# Patient Record
Sex: MALE | Race: WHITE
[De-identification: names, ages, dates, MRNs, and addresses within clinical notes are randomized per-mention and may not be internally consistent; named-entity substitution may affect disease eponyms.]

---

## 2019-03-19 ENCOUNTER — HOSPITAL ENCOUNTER (INPATIENT)
Dept: HOSPITAL 94 - PCU 3S | Age: 54
LOS: 5 days | Discharge: HOME | End: 2019-03-24
Payer: MEDICARE

## 2019-03-19 DIAGNOSIS — J18.9: ICD-10-CM

## 2019-03-19 DIAGNOSIS — A41.9: Primary | ICD-10-CM

## 2019-03-19 DIAGNOSIS — J44.1: ICD-10-CM

## 2019-03-19 DIAGNOSIS — J96.01: ICD-10-CM

## 2021-09-20 ENCOUNTER — HOSPITAL ENCOUNTER (INPATIENT)
Dept: HOSPITAL 94 - ER | Age: 56
LOS: 11 days | Discharge: NURSING, REHAB OR LONG-TERM CARE | DRG: 271 | End: 2021-10-01
Attending: INTERNAL MEDICINE | Admitting: INTERNAL MEDICINE
Payer: MEDICARE

## 2021-09-20 VITALS — BODY MASS INDEX: 31.32 KG/M2 | HEIGHT: 73 IN | WEIGHT: 236.34 LBS

## 2021-09-20 DIAGNOSIS — E66.9: ICD-10-CM

## 2021-09-20 DIAGNOSIS — F17.200: ICD-10-CM

## 2021-09-20 DIAGNOSIS — Z86.16: ICD-10-CM

## 2021-09-20 DIAGNOSIS — D86.9: ICD-10-CM

## 2021-09-20 DIAGNOSIS — Z85.038: ICD-10-CM

## 2021-09-20 DIAGNOSIS — Z96.649: ICD-10-CM

## 2021-09-20 DIAGNOSIS — J44.9: ICD-10-CM

## 2021-09-20 DIAGNOSIS — Z90.79: ICD-10-CM

## 2021-09-20 DIAGNOSIS — G62.9: ICD-10-CM

## 2021-09-20 DIAGNOSIS — I74.3: Primary | ICD-10-CM

## 2021-09-20 DIAGNOSIS — Z79.899: ICD-10-CM

## 2021-09-20 DIAGNOSIS — Z85.47: ICD-10-CM

## 2021-09-20 DIAGNOSIS — Z79.52: ICD-10-CM

## 2021-09-20 DIAGNOSIS — I10: ICD-10-CM

## 2021-09-20 DIAGNOSIS — N17.9: ICD-10-CM

## 2021-09-20 PROCEDURE — 80053 COMPREHEN METABOLIC PANEL: CPT

## 2021-09-20 PROCEDURE — 82948 REAGENT STRIP/BLOOD GLUCOSE: CPT

## 2021-09-20 PROCEDURE — 36246 INS CATH ABD/L-EXT ART 2ND: CPT

## 2021-09-20 PROCEDURE — 97161 PT EVAL LOW COMPLEX 20 MIN: CPT

## 2021-09-20 PROCEDURE — 86885 COOMBS TEST INDIRECT QUAL: CPT

## 2021-09-20 PROCEDURE — 96374 THER/PROPH/DIAG INJ IV PUSH: CPT

## 2021-09-20 PROCEDURE — 37213 THROMBLYTIC ART/VEN THERAPY: CPT

## 2021-09-20 PROCEDURE — 99291 CRITICAL CARE FIRST HOUR: CPT

## 2021-09-20 PROCEDURE — 80048 BASIC METABOLIC PNL TOTAL CA: CPT

## 2021-09-20 PROCEDURE — 99153 MOD SED SAME PHYS/QHP EA: CPT

## 2021-09-20 PROCEDURE — 85730 THROMBOPLASTIN TIME PARTIAL: CPT

## 2021-09-20 PROCEDURE — 87081 CULTURE SCREEN ONLY: CPT

## 2021-09-20 PROCEDURE — 97530 THERAPEUTIC ACTIVITIES: CPT

## 2021-09-20 PROCEDURE — 75710 ARTERY X-RAYS ARM/LEG: CPT

## 2021-09-20 PROCEDURE — 88304 TISSUE EXAM BY PATHOLOGIST: CPT

## 2021-09-20 PROCEDURE — 86901 BLOOD TYPING SEROLOGIC RH(D): CPT

## 2021-09-20 PROCEDURE — 85610 PROTHROMBIN TIME: CPT

## 2021-09-20 PROCEDURE — 85025 COMPLETE CBC W/AUTO DIFF WBC: CPT

## 2021-09-20 PROCEDURE — 36415 COLL VENOUS BLD VENIPUNCTURE: CPT

## 2021-09-20 PROCEDURE — 86900 BLOOD TYPING SEROLOGIC ABO: CPT

## 2021-09-20 PROCEDURE — 85384 FIBRINOGEN ACTIVITY: CPT

## 2021-09-20 PROCEDURE — 37184 PRIM ART M-THRMBC 1ST VSL: CPT

## 2021-09-20 PROCEDURE — 76937 US GUIDE VASCULAR ACCESS: CPT

## 2021-09-20 PROCEDURE — 37214 CESSJ THERAPY CATH REMOVAL: CPT

## 2021-09-20 PROCEDURE — 97116 GAIT TRAINING THERAPY: CPT

## 2021-09-20 PROCEDURE — 99152 MOD SED SAME PHYS/QHP 5/>YRS: CPT

## 2021-09-20 PROCEDURE — 85027 COMPLETE CBC AUTOMATED: CPT

## 2021-09-20 PROCEDURE — 85347 COAGULATION TIME ACTIVATED: CPT

## 2021-09-20 PROCEDURE — 37211 THROMBOLYTIC ART THERAPY: CPT

## 2021-09-20 PROCEDURE — 97110 THERAPEUTIC EXERCISES: CPT

## 2021-09-20 PROCEDURE — 37228: CPT

## 2021-09-21 VITALS — DIASTOLIC BLOOD PRESSURE: 87 MMHG | SYSTOLIC BLOOD PRESSURE: 135 MMHG

## 2021-09-21 VITALS — DIASTOLIC BLOOD PRESSURE: 85 MMHG | SYSTOLIC BLOOD PRESSURE: 135 MMHG

## 2021-09-21 VITALS — DIASTOLIC BLOOD PRESSURE: 90 MMHG | SYSTOLIC BLOOD PRESSURE: 123 MMHG

## 2021-09-21 VITALS — DIASTOLIC BLOOD PRESSURE: 88 MMHG | SYSTOLIC BLOOD PRESSURE: 154 MMHG

## 2021-09-21 VITALS — DIASTOLIC BLOOD PRESSURE: 87 MMHG | SYSTOLIC BLOOD PRESSURE: 138 MMHG

## 2021-09-21 VITALS — SYSTOLIC BLOOD PRESSURE: 138 MMHG | DIASTOLIC BLOOD PRESSURE: 99 MMHG

## 2021-09-21 VITALS — SYSTOLIC BLOOD PRESSURE: 158 MMHG | DIASTOLIC BLOOD PRESSURE: 92 MMHG

## 2021-09-21 VITALS — DIASTOLIC BLOOD PRESSURE: 88 MMHG | SYSTOLIC BLOOD PRESSURE: 129 MMHG

## 2021-09-21 VITALS — SYSTOLIC BLOOD PRESSURE: 128 MMHG | DIASTOLIC BLOOD PRESSURE: 87 MMHG

## 2021-09-21 VITALS — DIASTOLIC BLOOD PRESSURE: 97 MMHG | SYSTOLIC BLOOD PRESSURE: 135 MMHG

## 2021-09-21 VITALS — SYSTOLIC BLOOD PRESSURE: 143 MMHG | DIASTOLIC BLOOD PRESSURE: 86 MMHG

## 2021-09-21 VITALS — SYSTOLIC BLOOD PRESSURE: 117 MMHG | DIASTOLIC BLOOD PRESSURE: 81 MMHG

## 2021-09-21 VITALS — DIASTOLIC BLOOD PRESSURE: 94 MMHG | SYSTOLIC BLOOD PRESSURE: 143 MMHG

## 2021-09-21 VITALS — SYSTOLIC BLOOD PRESSURE: 124 MMHG | DIASTOLIC BLOOD PRESSURE: 83 MMHG

## 2021-09-21 VITALS — SYSTOLIC BLOOD PRESSURE: 150 MMHG | DIASTOLIC BLOOD PRESSURE: 90 MMHG

## 2021-09-21 VITALS — SYSTOLIC BLOOD PRESSURE: 138 MMHG | DIASTOLIC BLOOD PRESSURE: 93 MMHG

## 2021-09-21 VITALS — DIASTOLIC BLOOD PRESSURE: 77 MMHG | SYSTOLIC BLOOD PRESSURE: 128 MMHG

## 2021-09-21 VITALS — SYSTOLIC BLOOD PRESSURE: 126 MMHG | DIASTOLIC BLOOD PRESSURE: 84 MMHG

## 2021-09-21 VITALS — DIASTOLIC BLOOD PRESSURE: 87 MMHG | SYSTOLIC BLOOD PRESSURE: 136 MMHG

## 2021-09-21 VITALS — DIASTOLIC BLOOD PRESSURE: 87 MMHG | SYSTOLIC BLOOD PRESSURE: 134 MMHG

## 2021-09-21 LAB
ALBUMIN SERPL BCP-MCNC: 2.5 G/DL (ref 3.4–5)
ALBUMIN SERPL BCP-MCNC: 2.5 G/DL (ref 3.4–5)
ANION GAP SERPL CALCULATED.3IONS-SCNC: 10 MMOL/L (ref 8–16)
ANION GAP SERPL CALCULATED.3IONS-SCNC: 10 MMOL/L (ref 8–16)
APTT PPP: 76 SECONDS (ref 22–32)
BASOPHILS # BLD AUTO: 0.1 X10'3 (ref 0–0.2)
BASOPHILS # BLD AUTO: 0.1 X10'3 (ref 0–0.2)
BASOPHILS NFR BLD AUTO: 0.8 % (ref 0–1)
BASOPHILS NFR BLD AUTO: 0.9 % (ref 0–1)
BUN SERPL-MCNC: 19 MG/DL (ref 7–18)
BUN SERPL-MCNC: 21 MG/DL (ref 7–18)
BUN/CREAT SERPL: 17.6 (ref 5.4–32)
BUN/CREAT SERPL: 17.6 (ref 5.4–32)
CALCIUM SERPL-MCNC: 8.5 MG/DL (ref 8.5–10.1)
CALCIUM SERPL-MCNC: 8.7 MG/DL (ref 8.5–10.1)
CHLORIDE SERPL-SCNC: 106 MMOL/L (ref 99–107)
CHLORIDE SERPL-SCNC: 107 MMOL/L (ref 99–107)
CO2 SERPL-SCNC: 26.5 MMOL/L (ref 24–32)
CO2 SERPL-SCNC: 27.1 MMOL/L (ref 24–32)
CREAT SERPL-MCNC: 1.08 MG/DL (ref 0.6–1.1)
CREAT SERPL-MCNC: 1.19 MG/DL (ref 0.6–1.1)
EOSINOPHIL # BLD AUTO: 0.1 X10'3 (ref 0–0.9)
EOSINOPHIL # BLD AUTO: 0.1 X10'3 (ref 0–0.9)
EOSINOPHIL NFR BLD AUTO: 1.1 % (ref 0–6)
EOSINOPHIL NFR BLD AUTO: 1.3 % (ref 0–6)
ERYTHROCYTE [DISTWIDTH] IN BLOOD BY AUTOMATED COUNT: 15.7 % (ref 11.5–14.5)
ERYTHROCYTE [DISTWIDTH] IN BLOOD BY AUTOMATED COUNT: 15.7 % (ref 11.5–14.5)
ERYTHROCYTE [DISTWIDTH] IN BLOOD BY AUTOMATED COUNT: 15.8 % (ref 11.5–14.5)
ERYTHROCYTE [DISTWIDTH] IN BLOOD BY AUTOMATED COUNT: 15.9 % (ref 11.5–14.5)
ERYTHROCYTE [DISTWIDTH] IN BLOOD BY AUTOMATED COUNT: 16.3 % (ref 11.5–14.5)
GFR SERPL CREATININE-BSD FRML MDRD: 63 ML/MIN
GFR SERPL CREATININE-BSD FRML MDRD: 70 ML/MIN
GLUCOSE SERPL-MCNC: 109 MG/DL (ref 70–104)
GLUCOSE SERPL-MCNC: 85 MG/DL (ref 70–104)
HCT VFR BLD AUTO: 44 % (ref 42–52)
HCT VFR BLD AUTO: 44.3 % (ref 42–52)
HCT VFR BLD AUTO: 44.5 % (ref 42–52)
HCT VFR BLD AUTO: 44.7 % (ref 42–52)
HCT VFR BLD AUTO: 46.7 % (ref 42–52)
HGB BLD-MCNC: 14.4 G/DL (ref 14–17.9)
HGB BLD-MCNC: 14.6 G/DL (ref 14–17.9)
HGB BLD-MCNC: 14.7 G/DL (ref 14–17.9)
HGB BLD-MCNC: 14.8 G/DL (ref 14–17.9)
HGB BLD-MCNC: 15.2 G/DL (ref 14–17.9)
LYMPHOCYTES # BLD AUTO: 1.2 X10'3 (ref 1.1–4.8)
LYMPHOCYTES # BLD AUTO: 1.3 X10'3 (ref 1.1–4.8)
LYMPHOCYTES NFR BLD AUTO: 12.6 % (ref 21–51)
LYMPHOCYTES NFR BLD AUTO: 13.9 % (ref 21–51)
MCH RBC QN AUTO: 28.2 PG (ref 27–31)
MCH RBC QN AUTO: 28.5 PG (ref 27–31)
MCH RBC QN AUTO: 28.5 PG (ref 27–31)
MCH RBC QN AUTO: 28.6 PG (ref 27–31)
MCH RBC QN AUTO: 28.8 PG (ref 27–31)
MCHC RBC AUTO-ENTMCNC: 32.5 G/DL (ref 33–36.5)
MCHC RBC AUTO-ENTMCNC: 32.7 G/DL (ref 33–36.5)
MCHC RBC AUTO-ENTMCNC: 32.8 G/DL (ref 33–36.5)
MCHC RBC AUTO-ENTMCNC: 33.1 G/DL (ref 33–36.5)
MCHC RBC AUTO-ENTMCNC: 33.2 G/DL (ref 33–36.5)
MCV RBC AUTO: 86 FL (ref 78–98)
MCV RBC AUTO: 86.1 FL (ref 78–98)
MCV RBC AUTO: 86.2 FL (ref 78–98)
MCV RBC AUTO: 87.5 FL (ref 78–98)
MCV RBC AUTO: 87.8 FL (ref 78–98)
MONOCYTES # BLD AUTO: 1.1 X10'3 (ref 0–0.9)
MONOCYTES # BLD AUTO: 1.3 X10'3 (ref 0–0.9)
MONOCYTES NFR BLD AUTO: 12.1 % (ref 2–12)
MONOCYTES NFR BLD AUTO: 12.6 % (ref 2–12)
NEUTROPHILS # BLD AUTO: 6.5 X10'3 (ref 1.8–7.7)
NEUTROPHILS # BLD AUTO: 7.4 X10'3 (ref 1.8–7.7)
NEUTROPHILS NFR BLD AUTO: 71.9 % (ref 42–75)
NEUTROPHILS NFR BLD AUTO: 72.8 % (ref 42–75)
PLATELET # BLD AUTO: 239 X10'3 (ref 140–440)
PLATELET # BLD AUTO: 276 X10'3 (ref 140–440)
PLATELET # BLD AUTO: 278 X10'3 (ref 140–440)
PLATELET # BLD AUTO: 279 X10'3 (ref 140–440)
PLATELET # BLD AUTO: 301 X10'3 (ref 140–440)
PMV BLD AUTO: 6.9 FL (ref 7.4–10.4)
PMV BLD AUTO: 7 FL (ref 7.4–10.4)
PMV BLD AUTO: 7 FL (ref 7.4–10.4)
PMV BLD AUTO: 7.1 FL (ref 7.4–10.4)
PMV BLD AUTO: 7.2 FL (ref 7.4–10.4)
POTASSIUM SERPL-SCNC: 4.2 MMOL/L (ref 3.5–5.1)
POTASSIUM SERPL-SCNC: 4.6 MMOL/L (ref 3.5–5.1)
RBC # BLD AUTO: 5.01 X10'6 (ref 4.7–6.1)
RBC # BLD AUTO: 5.15 X10'6 (ref 4.7–6.1)
RBC # BLD AUTO: 5.18 X10'6 (ref 4.7–6.1)
RBC # BLD AUTO: 5.19 X10'6 (ref 4.7–6.1)
RBC # BLD AUTO: 5.34 X10'6 (ref 4.7–6.1)
SODIUM SERPL-SCNC: 143 MMOL/L (ref 135–145)
SODIUM SERPL-SCNC: 143 MMOL/L (ref 135–145)
WBC # BLD AUTO: 10.2 X10'3 (ref 4.5–11)
WBC # BLD AUTO: 9 X10'3 (ref 4.5–11)
WBC # BLD AUTO: 9.1 X10'3 (ref 4.5–11)
WBC # BLD AUTO: 9.2 X10'3 (ref 4.5–11)
WBC # BLD AUTO: 9.2 X10'3 (ref 4.5–11)

## 2021-09-21 PROCEDURE — B41G1ZZ FLUOROSCOPY OF LEFT LOWER EXTREMITY ARTERIES USING LOW OSMOLAR CONTRAST: ICD-10-PCS | Performed by: RADIOLOGY

## 2021-09-21 PROCEDURE — B41C1ZZ FLUOROSCOPY OF PELVIC ARTERIES USING LOW OSMOLAR CONTRAST: ICD-10-PCS | Performed by: RADIOLOGY

## 2021-09-21 PROCEDURE — 04HL33Z INSERTION OF INFUSION DEVICE INTO LEFT FEMORAL ARTERY, PERCUTANEOUS APPROACH: ICD-10-PCS | Performed by: RADIOLOGY

## 2021-09-21 PROCEDURE — 3E05317 INTRODUCTION OF OTHER THROMBOLYTIC INTO PERIPHERAL ARTERY, PERCUTANEOUS APPROACH: ICD-10-PCS | Performed by: RADIOLOGY

## 2021-09-21 RX ADMIN — HYDROMORPHONE HYDROCHLORIDE SCH MLS/HR: 10 INJECTION, SOLUTION INTRAMUSCULAR; INTRAVENOUS; SUBCUTANEOUS at 19:00

## 2021-09-21 RX ADMIN — ALTEPLASE SCH MLS/HR: 2.2 INJECTION, POWDER, LYOPHILIZED, FOR SOLUTION INTRAVENOUS at 03:44

## 2021-09-21 RX ADMIN — HYDROMORPHONE HYDROCHLORIDE SCH MLS/HR: 10 INJECTION, SOLUTION INTRAMUSCULAR; INTRAVENOUS; SUBCUTANEOUS at 21:00

## 2021-09-21 RX ADMIN — HYDROMORPHONE HYDROCHLORIDE SCH MLS/HR: 10 INJECTION, SOLUTION INTRAMUSCULAR; INTRAVENOUS; SUBCUTANEOUS at 17:12

## 2021-09-21 RX ADMIN — ALTEPLASE SCH MLS/HR: 2.2 INJECTION, POWDER, LYOPHILIZED, FOR SOLUTION INTRAVENOUS at 19:09

## 2021-09-21 RX ADMIN — ALTEPLASE SCH MLS/HR: 2.2 INJECTION, POWDER, LYOPHILIZED, FOR SOLUTION INTRAVENOUS at 00:50

## 2021-09-21 RX ADMIN — HEPARIN SODIUM IN SODIUM CHLORIDE SCH MLS/HR: 200 INJECTION INTRAVENOUS at 19:12

## 2021-09-21 RX ADMIN — HEPARIN SODIUM IN SODIUM CHLORIDE SCH MLS/HR: 200 INJECTION INTRAVENOUS at 15:05

## 2021-09-21 RX ADMIN — BUPROPION HYDROCHLORIDE SCH MG: 150 TABLET, FILM COATED, EXTENDED RELEASE ORAL at 20:52

## 2021-09-21 RX ADMIN — ALTEPLASE SCH MLS/HR: 2.2 INJECTION, POWDER, LYOPHILIZED, FOR SOLUTION INTRAVENOUS at 09:38

## 2021-09-21 RX ADMIN — HYDROMORPHONE HYDROCHLORIDE SCH MLS/HR: 10 INJECTION, SOLUTION INTRAMUSCULAR; INTRAVENOUS; SUBCUTANEOUS at 19:09

## 2021-09-21 RX ADMIN — MORPHINE SULFATE PRN MG: 4 INJECTION, SOLUTION INTRAMUSCULAR; INTRAVENOUS at 06:18

## 2021-09-21 RX ADMIN — MORPHINE SULFATE PRN MG: 4 INJECTION, SOLUTION INTRAMUSCULAR; INTRAVENOUS at 08:34

## 2021-09-21 RX ADMIN — HEPARIN SODIUM IN SODIUM CHLORIDE SCH MLS/HR: 200 INJECTION INTRAVENOUS at 03:43

## 2021-09-21 RX ADMIN — HYDROMORPHONE HYDROCHLORIDE SCH MLS/HR: 10 INJECTION, SOLUTION INTRAMUSCULAR; INTRAVENOUS; SUBCUTANEOUS at 23:00

## 2021-09-21 NOTE — NUR
Dr Keita called to check on pt. Reported that I was unable to find a left dorsalis pedal 
pulse with doppler.  Left posterior tibial pulse was present with doppler. Pt able to move 
bilateral feet.  Pt reporting increasing reperfusion pain.  Received orders to continue 
monitoring and report angiogram results.

## 2021-09-21 NOTE — NUR
Problems reprioritized. Patient report given, questions answered & plan of care reviewed 
with JOSEPH Matthews.

## 2021-09-21 NOTE — NUR
Dr. Ramsay rounded on pt and assessed BLE closely. She was able to doppler and jossy a 
dorsalis pedal pulse and marked pulse. 500 mL fluid bolus ordered and fluid rate of NS at 
125 mL/hr ordered.

## 2021-09-21 NOTE — NUR
Angio nurse Connie RUIZ discovered tPA infusing at incorrect rate and reported this to DR Aponte. Correction to rate made.

## 2021-09-21 NOTE — NUR
Problems reprioritized. Patient report given, questions answered & plan of care reviewed 
with JOSEPH Olmos.

## 2021-09-22 VITALS — DIASTOLIC BLOOD PRESSURE: 87 MMHG | SYSTOLIC BLOOD PRESSURE: 147 MMHG

## 2021-09-22 VITALS — DIASTOLIC BLOOD PRESSURE: 97 MMHG | SYSTOLIC BLOOD PRESSURE: 159 MMHG

## 2021-09-22 VITALS — SYSTOLIC BLOOD PRESSURE: 157 MMHG | DIASTOLIC BLOOD PRESSURE: 91 MMHG

## 2021-09-22 VITALS — SYSTOLIC BLOOD PRESSURE: 156 MMHG | DIASTOLIC BLOOD PRESSURE: 91 MMHG

## 2021-09-22 VITALS — DIASTOLIC BLOOD PRESSURE: 98 MMHG | SYSTOLIC BLOOD PRESSURE: 165 MMHG

## 2021-09-22 VITALS — DIASTOLIC BLOOD PRESSURE: 97 MMHG | SYSTOLIC BLOOD PRESSURE: 149 MMHG

## 2021-09-22 VITALS — DIASTOLIC BLOOD PRESSURE: 104 MMHG | SYSTOLIC BLOOD PRESSURE: 154 MMHG

## 2021-09-22 VITALS — DIASTOLIC BLOOD PRESSURE: 89 MMHG | SYSTOLIC BLOOD PRESSURE: 154 MMHG

## 2021-09-22 VITALS — DIASTOLIC BLOOD PRESSURE: 89 MMHG | SYSTOLIC BLOOD PRESSURE: 160 MMHG

## 2021-09-22 VITALS — DIASTOLIC BLOOD PRESSURE: 94 MMHG | SYSTOLIC BLOOD PRESSURE: 162 MMHG

## 2021-09-22 VITALS — SYSTOLIC BLOOD PRESSURE: 157 MMHG | DIASTOLIC BLOOD PRESSURE: 96 MMHG

## 2021-09-22 VITALS — DIASTOLIC BLOOD PRESSURE: 83 MMHG | SYSTOLIC BLOOD PRESSURE: 154 MMHG

## 2021-09-22 VITALS — SYSTOLIC BLOOD PRESSURE: 167 MMHG | DIASTOLIC BLOOD PRESSURE: 93 MMHG

## 2021-09-22 VITALS — DIASTOLIC BLOOD PRESSURE: 97 MMHG | SYSTOLIC BLOOD PRESSURE: 151 MMHG

## 2021-09-22 VITALS — DIASTOLIC BLOOD PRESSURE: 82 MMHG | SYSTOLIC BLOOD PRESSURE: 144 MMHG

## 2021-09-22 VITALS — SYSTOLIC BLOOD PRESSURE: 147 MMHG | DIASTOLIC BLOOD PRESSURE: 96 MMHG

## 2021-09-22 VITALS — DIASTOLIC BLOOD PRESSURE: 93 MMHG | SYSTOLIC BLOOD PRESSURE: 159 MMHG

## 2021-09-22 VITALS — DIASTOLIC BLOOD PRESSURE: 81 MMHG | SYSTOLIC BLOOD PRESSURE: 138 MMHG

## 2021-09-22 VITALS — SYSTOLIC BLOOD PRESSURE: 146 MMHG | DIASTOLIC BLOOD PRESSURE: 99 MMHG

## 2021-09-22 VITALS — SYSTOLIC BLOOD PRESSURE: 156 MMHG | DIASTOLIC BLOOD PRESSURE: 90 MMHG

## 2021-09-22 VITALS — SYSTOLIC BLOOD PRESSURE: 144 MMHG | DIASTOLIC BLOOD PRESSURE: 92 MMHG

## 2021-09-22 LAB
ALBUMIN SERPL BCP-MCNC: 2.5 G/DL (ref 3.4–5)
ALBUMIN/GLOB SERPL: 0.6 {RATIO} (ref 1.1–1.5)
ALP SERPL-CCNC: 135 IU/L (ref 46–116)
ALT SERPL W P-5'-P-CCNC: 32 U/L (ref 12–78)
ANION GAP SERPL CALCULATED.3IONS-SCNC: 9 MMOL/L (ref 8–16)
AST SERPL W P-5'-P-CCNC: 21 U/L (ref 10–37)
BASOPHILS # BLD AUTO: 0.1 X10'3 (ref 0–0.2)
BASOPHILS NFR BLD AUTO: 0.5 % (ref 0–1)
BILIRUB SERPL-MCNC: 0.8 MG/DL (ref 0.1–1)
BUN SERPL-MCNC: 17 MG/DL (ref 7–18)
BUN/CREAT SERPL: 13.8 (ref 5.4–32)
CALCIUM SERPL-MCNC: 8.5 MG/DL (ref 8.5–10.1)
CHLORIDE SERPL-SCNC: 105 MMOL/L (ref 99–107)
CO2 SERPL-SCNC: 27.5 MMOL/L (ref 24–32)
CREAT SERPL-MCNC: 1.23 MG/DL (ref 0.6–1.1)
EOSINOPHIL # BLD AUTO: 0.1 X10'3 (ref 0–0.9)
EOSINOPHIL NFR BLD AUTO: 1.1 % (ref 0–6)
ERYTHROCYTE [DISTWIDTH] IN BLOOD BY AUTOMATED COUNT: 15.5 % (ref 11.5–14.5)
ERYTHROCYTE [DISTWIDTH] IN BLOOD BY AUTOMATED COUNT: 15.6 % (ref 11.5–14.5)
ERYTHROCYTE [DISTWIDTH] IN BLOOD BY AUTOMATED COUNT: 15.6 % (ref 11.5–14.5)
ERYTHROCYTE [DISTWIDTH] IN BLOOD BY AUTOMATED COUNT: 15.8 % (ref 11.5–14.5)
GFR SERPL CREATININE-BSD FRML MDRD: 60 ML/MIN
GLUCOSE SERPL-MCNC: 89 MG/DL (ref 70–104)
HCT VFR BLD AUTO: 45.5 % (ref 42–52)
HCT VFR BLD AUTO: 45.9 % (ref 42–52)
HCT VFR BLD AUTO: 46.7 % (ref 42–52)
HCT VFR BLD AUTO: 47 % (ref 42–52)
HGB BLD-MCNC: 15.1 G/DL (ref 14–17.9)
HGB BLD-MCNC: 15.2 G/DL (ref 14–17.9)
HGB BLD-MCNC: 15.4 G/DL (ref 14–17.9)
HGB BLD-MCNC: 15.6 G/DL (ref 14–17.9)
LYMPHOCYTES # BLD AUTO: 0.7 X10'3 (ref 1.1–4.8)
LYMPHOCYTES NFR BLD AUTO: 6.3 % (ref 21–51)
MCH RBC QN AUTO: 28.7 PG (ref 27–31)
MCH RBC QN AUTO: 28.8 PG (ref 27–31)
MCH RBC QN AUTO: 28.9 PG (ref 27–31)
MCH RBC QN AUTO: 28.9 PG (ref 27–31)
MCHC RBC AUTO-ENTMCNC: 32.7 G/DL (ref 33–36.5)
MCHC RBC AUTO-ENTMCNC: 33 G/DL (ref 33–36.5)
MCHC RBC AUTO-ENTMCNC: 33.1 G/DL (ref 33–36.5)
MCHC RBC AUTO-ENTMCNC: 33.3 G/DL (ref 33–36.5)
MCV RBC AUTO: 86.7 FL (ref 78–98)
MCV RBC AUTO: 86.9 FL (ref 78–98)
MCV RBC AUTO: 87.3 FL (ref 78–98)
MCV RBC AUTO: 88.1 FL (ref 78–98)
MONOCYTES # BLD AUTO: 1.1 X10'3 (ref 0–0.9)
MONOCYTES NFR BLD AUTO: 10.6 % (ref 2–12)
NEUTROPHILS # BLD AUTO: 8.7 X10'3 (ref 1.8–7.7)
NEUTROPHILS NFR BLD AUTO: 81.5 % (ref 42–75)
PLATELET # BLD AUTO: 233 X10'3 (ref 140–440)
PLATELET # BLD AUTO: 255 X10'3 (ref 140–440)
PLATELET # BLD AUTO: 261 X10'3 (ref 140–440)
PLATELET # BLD AUTO: 267 X10'3 (ref 140–440)
PMV BLD AUTO: 7.1 FL (ref 7.4–10.4)
PMV BLD AUTO: 7.2 FL (ref 7.4–10.4)
POTASSIUM SERPL-SCNC: 4.5 MMOL/L (ref 3.5–5.1)
PROT SERPL-MCNC: 6.5 G/DL (ref 6.4–8.2)
RBC # BLD AUTO: 5.22 X10'6 (ref 4.7–6.1)
RBC # BLD AUTO: 5.28 X10'6 (ref 4.7–6.1)
RBC # BLD AUTO: 5.34 X10'6 (ref 4.7–6.1)
RBC # BLD AUTO: 5.39 X10'6 (ref 4.7–6.1)
SODIUM SERPL-SCNC: 141 MMOL/L (ref 135–145)
WBC # BLD AUTO: 10.6 X10'3 (ref 4.5–11)
WBC # BLD AUTO: 10.7 X10'3 (ref 4.5–11)
WBC # BLD AUTO: 10.8 X10'3 (ref 4.5–11)
WBC # BLD AUTO: 12.5 X10'3 (ref 4.5–11)

## 2021-09-22 PROCEDURE — 3E05317 INTRODUCTION OF OTHER THROMBOLYTIC INTO PERIPHERAL ARTERY, PERCUTANEOUS APPROACH: ICD-10-PCS | Performed by: RADIOLOGY

## 2021-09-22 PROCEDURE — 047S3ZZ DILATION OF LEFT POSTERIOR TIBIAL ARTERY, PERCUTANEOUS APPROACH: ICD-10-PCS | Performed by: RADIOLOGY

## 2021-09-22 PROCEDURE — X2CT3T7 EXTIRPATION OF MATTER FROM LEFT LOWER EXTREMITY ARTERY USING COMPUTER-AIDED MECHANICAL ASPIRATION, PERCUTANEOUS APPROACH, NEW TECHNOLOGY GROUP 7: ICD-10-PCS | Performed by: RADIOLOGY

## 2021-09-22 RX ADMIN — HYDROMORPHONE HYDROCHLORIDE SCH MLS/HR: 10 INJECTION, SOLUTION INTRAMUSCULAR; INTRAVENOUS; SUBCUTANEOUS at 05:00

## 2021-09-22 RX ADMIN — HYDROMORPHONE HYDROCHLORIDE SCH MLS/HR: 10 INJECTION, SOLUTION INTRAMUSCULAR; INTRAVENOUS; SUBCUTANEOUS at 09:00

## 2021-09-22 RX ADMIN — ALTEPLASE SCH MLS/HR: 2.2 INJECTION, POWDER, LYOPHILIZED, FOR SOLUTION INTRAVENOUS at 16:21

## 2021-09-22 RX ADMIN — HEPARIN SODIUM AND DEXTROSE SCH MLS/HR: 10000; 5 INJECTION INTRAVENOUS at 20:10

## 2021-09-22 RX ADMIN — HYDROMORPHONE HYDROCHLORIDE SCH MLS/HR: 10 INJECTION, SOLUTION INTRAMUSCULAR; INTRAVENOUS; SUBCUTANEOUS at 13:00

## 2021-09-22 RX ADMIN — HYDROMORPHONE HYDROCHLORIDE SCH MLS/HR: 10 INJECTION, SOLUTION INTRAMUSCULAR; INTRAVENOUS; SUBCUTANEOUS at 01:00

## 2021-09-22 RX ADMIN — HYDROMORPHONE HYDROCHLORIDE SCH MLS/HR: 10 INJECTION, SOLUTION INTRAMUSCULAR; INTRAVENOUS; SUBCUTANEOUS at 18:56

## 2021-09-22 RX ADMIN — ALTEPLASE SCH MLS/HR: 2.2 INJECTION, POWDER, LYOPHILIZED, FOR SOLUTION INTRAVENOUS at 21:40

## 2021-09-22 RX ADMIN — HYDROMORPHONE HYDROCHLORIDE SCH MLS/HR: 10 INJECTION, SOLUTION INTRAMUSCULAR; INTRAVENOUS; SUBCUTANEOUS at 15:00

## 2021-09-22 RX ADMIN — BUPROPION HYDROCHLORIDE SCH MG: 150 TABLET, FILM COATED, EXTENDED RELEASE ORAL at 08:41

## 2021-09-22 RX ADMIN — HEPARIN SODIUM IN SODIUM CHLORIDE SCH MLS/HR: 200 INJECTION INTRAVENOUS at 16:37

## 2021-09-22 RX ADMIN — HYDROMORPHONE HYDROCHLORIDE SCH MLS/HR: 10 INJECTION, SOLUTION INTRAMUSCULAR; INTRAVENOUS; SUBCUTANEOUS at 07:00

## 2021-09-22 RX ADMIN — HEPARIN SODIUM IN SODIUM CHLORIDE SCH MLS/HR: 200 INJECTION INTRAVENOUS at 03:25

## 2021-09-22 RX ADMIN — HYDROMORPHONE HYDROCHLORIDE SCH MLS/HR: 10 INJECTION, SOLUTION INTRAMUSCULAR; INTRAVENOUS; SUBCUTANEOUS at 17:00

## 2021-09-22 RX ADMIN — HYDROMORPHONE HYDROCHLORIDE SCH MLS/HR: 10 INJECTION, SOLUTION INTRAMUSCULAR; INTRAVENOUS; SUBCUTANEOUS at 23:00

## 2021-09-22 RX ADMIN — HYDROMORPHONE HYDROCHLORIDE SCH MLS/HR: 10 INJECTION, SOLUTION INTRAMUSCULAR; INTRAVENOUS; SUBCUTANEOUS at 11:00

## 2021-09-22 RX ADMIN — ALTEPLASE SCH MLS/HR: 2.2 INJECTION, POWDER, LYOPHILIZED, FOR SOLUTION INTRAVENOUS at 03:26

## 2021-09-22 RX ADMIN — BUPROPION HYDROCHLORIDE SCH MG: 150 TABLET, FILM COATED, EXTENDED RELEASE ORAL at 23:03

## 2021-09-22 RX ADMIN — HYDROMORPHONE HYDROCHLORIDE SCH MLS/HR: 10 INJECTION, SOLUTION INTRAMUSCULAR; INTRAVENOUS; SUBCUTANEOUS at 03:00

## 2021-09-22 RX ADMIN — ALTEPLASE SCH MLS/HR: 2.2 INJECTION, POWDER, LYOPHILIZED, FOR SOLUTION INTRAVENOUS at 12:01

## 2021-09-22 RX ADMIN — HYDROMORPHONE HYDROCHLORIDE SCH MLS/HR: 10 INJECTION, SOLUTION INTRAMUSCULAR; INTRAVENOUS; SUBCUTANEOUS at 21:00

## 2021-09-22 NOTE — NUR
Problems reprioritized. Patient report given, questions answered & plan of care reviewed 
with JOSEPH Munoz.

## 2021-09-22 NOTE — NUR
Pt left transported to IR at 1745, transported via bed with heart monitor,  O2 2L, mask and 
x2 RNs ; hand off given at bedside to transport staff ; VS stable ; ETA is 45 minutes if no 
further interventions beyond "pictures". Hand off given to oncoming RN, PCA Dilaudid, 
Heparin and tPA drips still infusing, D5NS @125, No s/s of distress noted.

## 2021-09-23 VITALS — DIASTOLIC BLOOD PRESSURE: 84 MMHG | SYSTOLIC BLOOD PRESSURE: 148 MMHG

## 2021-09-23 VITALS — DIASTOLIC BLOOD PRESSURE: 82 MMHG | SYSTOLIC BLOOD PRESSURE: 153 MMHG

## 2021-09-23 VITALS — DIASTOLIC BLOOD PRESSURE: 88 MMHG | SYSTOLIC BLOOD PRESSURE: 139 MMHG

## 2021-09-23 VITALS — DIASTOLIC BLOOD PRESSURE: 96 MMHG | SYSTOLIC BLOOD PRESSURE: 144 MMHG

## 2021-09-23 VITALS — SYSTOLIC BLOOD PRESSURE: 153 MMHG | DIASTOLIC BLOOD PRESSURE: 89 MMHG

## 2021-09-23 VITALS — SYSTOLIC BLOOD PRESSURE: 128 MMHG | DIASTOLIC BLOOD PRESSURE: 76 MMHG

## 2021-09-23 VITALS — DIASTOLIC BLOOD PRESSURE: 54 MMHG | SYSTOLIC BLOOD PRESSURE: 147 MMHG

## 2021-09-23 VITALS — DIASTOLIC BLOOD PRESSURE: 87 MMHG | SYSTOLIC BLOOD PRESSURE: 147 MMHG

## 2021-09-23 VITALS — DIASTOLIC BLOOD PRESSURE: 97 MMHG | SYSTOLIC BLOOD PRESSURE: 149 MMHG

## 2021-09-23 VITALS — SYSTOLIC BLOOD PRESSURE: 154 MMHG | DIASTOLIC BLOOD PRESSURE: 81 MMHG

## 2021-09-23 VITALS — SYSTOLIC BLOOD PRESSURE: 189 MMHG | DIASTOLIC BLOOD PRESSURE: 111 MMHG

## 2021-09-23 VITALS — SYSTOLIC BLOOD PRESSURE: 152 MMHG | DIASTOLIC BLOOD PRESSURE: 89 MMHG

## 2021-09-23 VITALS — SYSTOLIC BLOOD PRESSURE: 136 MMHG | DIASTOLIC BLOOD PRESSURE: 100 MMHG

## 2021-09-23 VITALS — SYSTOLIC BLOOD PRESSURE: 163 MMHG | DIASTOLIC BLOOD PRESSURE: 90 MMHG

## 2021-09-23 VITALS — DIASTOLIC BLOOD PRESSURE: 77 MMHG | SYSTOLIC BLOOD PRESSURE: 173 MMHG

## 2021-09-23 VITALS — DIASTOLIC BLOOD PRESSURE: 85 MMHG | SYSTOLIC BLOOD PRESSURE: 159 MMHG

## 2021-09-23 VITALS — SYSTOLIC BLOOD PRESSURE: 161 MMHG | DIASTOLIC BLOOD PRESSURE: 79 MMHG

## 2021-09-23 VITALS — SYSTOLIC BLOOD PRESSURE: 167 MMHG | DIASTOLIC BLOOD PRESSURE: 97 MMHG

## 2021-09-23 VITALS — SYSTOLIC BLOOD PRESSURE: 146 MMHG | DIASTOLIC BLOOD PRESSURE: 90 MMHG

## 2021-09-23 LAB
APTT PPP: 56 SECONDS (ref 22–32)
BASOPHILS # BLD AUTO: 0.1 X10'3 (ref 0–0.2)
BASOPHILS NFR BLD AUTO: 0.7 % (ref 0–1)
EOSINOPHIL # BLD AUTO: 0.1 X10'3 (ref 0–0.9)
EOSINOPHIL NFR BLD AUTO: 0.7 % (ref 0–6)
ERYTHROCYTE [DISTWIDTH] IN BLOOD BY AUTOMATED COUNT: 15.4 % (ref 11.5–14.5)
ERYTHROCYTE [DISTWIDTH] IN BLOOD BY AUTOMATED COUNT: 15.5 % (ref 11.5–14.5)
HCT VFR BLD AUTO: 45.2 % (ref 42–52)
HCT VFR BLD AUTO: 45.9 % (ref 42–52)
HGB BLD-MCNC: 14.9 G/DL (ref 14–17.9)
HGB BLD-MCNC: 15 G/DL (ref 14–17.9)
LYMPHOCYTES # BLD AUTO: 0.7 X10'3 (ref 1.1–4.8)
LYMPHOCYTES NFR BLD AUTO: 6.5 % (ref 21–51)
MCH RBC QN AUTO: 28.6 PG (ref 27–31)
MCH RBC QN AUTO: 28.8 PG (ref 27–31)
MCHC RBC AUTO-ENTMCNC: 32.7 G/DL (ref 33–36.5)
MCHC RBC AUTO-ENTMCNC: 32.9 G/DL (ref 33–36.5)
MCV RBC AUTO: 87.4 FL (ref 78–98)
MCV RBC AUTO: 87.4 FL (ref 78–98)
MONOCYTES # BLD AUTO: 1.5 X10'3 (ref 0–0.9)
MONOCYTES NFR BLD AUTO: 13.4 % (ref 2–12)
NEUTROPHILS # BLD AUTO: 9 X10'3 (ref 1.8–7.7)
NEUTROPHILS NFR BLD AUTO: 78.7 % (ref 42–75)
PLATELET # BLD AUTO: 235 X10'3 (ref 140–440)
PLATELET # BLD AUTO: 246 X10'3 (ref 140–440)
PMV BLD AUTO: 7.1 FL (ref 7.4–10.4)
PMV BLD AUTO: 7.5 FL (ref 7.4–10.4)
RBC # BLD AUTO: 5.17 X10'6 (ref 4.7–6.1)
RBC # BLD AUTO: 5.25 X10'6 (ref 4.7–6.1)
WBC # BLD AUTO: 10.1 X10'3 (ref 4.5–11)
WBC # BLD AUTO: 11.4 X10'3 (ref 4.5–11)

## 2021-09-23 PROCEDURE — 04CS0ZZ EXTIRPATION OF MATTER FROM LEFT POSTERIOR TIBIAL ARTERY, OPEN APPROACH: ICD-10-PCS | Performed by: SURGERY

## 2021-09-23 PROCEDURE — B41G1ZZ FLUOROSCOPY OF LEFT LOWER EXTREMITY ARTERIES USING LOW OSMOLAR CONTRAST: ICD-10-PCS | Performed by: RADIOLOGY

## 2021-09-23 PROCEDURE — 0J8P0ZZ DIVISION OF LEFT LOWER LEG SUBCUTANEOUS TISSUE AND FASCIA, OPEN APPROACH: ICD-10-PCS | Performed by: SURGERY

## 2021-09-23 PROCEDURE — 047Q3ZZ DILATION OF LEFT ANTERIOR TIBIAL ARTERY, PERCUTANEOUS APPROACH: ICD-10-PCS | Performed by: RADIOLOGY

## 2021-09-23 RX ADMIN — HYDROMORPHONE HYDROCHLORIDE SCH MLS/HR: 10 INJECTION, SOLUTION INTRAMUSCULAR; INTRAVENOUS; SUBCUTANEOUS at 19:00

## 2021-09-23 RX ADMIN — HYDROMORPHONE HYDROCHLORIDE SCH MLS/HR: 10 INJECTION, SOLUTION INTRAMUSCULAR; INTRAVENOUS; SUBCUTANEOUS at 01:00

## 2021-09-23 RX ADMIN — HYDROMORPHONE HYDROCHLORIDE SCH MLS/HR: 10 INJECTION, SOLUTION INTRAMUSCULAR; INTRAVENOUS; SUBCUTANEOUS at 03:00

## 2021-09-23 RX ADMIN — BUPROPION HYDROCHLORIDE SCH MG: 150 TABLET, FILM COATED, EXTENDED RELEASE ORAL at 20:00

## 2021-09-23 RX ADMIN — HYDROMORPHONE HYDROCHLORIDE SCH MLS/HR: 10 INJECTION, SOLUTION INTRAMUSCULAR; INTRAVENOUS; SUBCUTANEOUS at 04:19

## 2021-09-23 RX ADMIN — ALTEPLASE SCH MLS/HR: 2.2 INJECTION, POWDER, LYOPHILIZED, FOR SOLUTION INTRAVENOUS at 16:21

## 2021-09-23 RX ADMIN — ALTEPLASE SCH MLS/HR: 2.2 INJECTION, POWDER, LYOPHILIZED, FOR SOLUTION INTRAVENOUS at 08:21

## 2021-09-23 RX ADMIN — BUPROPION HYDROCHLORIDE SCH MG: 150 TABLET, FILM COATED, EXTENDED RELEASE ORAL at 08:40

## 2021-09-23 RX ADMIN — HYDROMORPHONE HYDROCHLORIDE SCH MLS/HR: 10 INJECTION, SOLUTION INTRAMUSCULAR; INTRAVENOUS; SUBCUTANEOUS at 23:00

## 2021-09-23 RX ADMIN — HEPARIN SODIUM AND DEXTROSE SCH MLS/HR: 10000; 5 INJECTION INTRAVENOUS at 23:19

## 2021-09-23 RX ADMIN — HYDROMORPHONE HYDROCHLORIDE SCH MLS/HR: 10 INJECTION, SOLUTION INTRAMUSCULAR; INTRAVENOUS; SUBCUTANEOUS at 05:00

## 2021-09-23 NOTE — NUR
Patient taken to surgery for procedure; left in alert and responsive state, denied 
respiratory and physical distress at time of leaving. Vitals 139/77; 88; 18; 98.0; 95 on 2L

## 2021-09-23 NOTE — NUR
Pt left transported to IR for imaging;  transported via bed with heart monitor,  O2 2L, mask 
and x2 RNs ; Pt returned at 1600 ; upon return D5NS, CADD Dilaudid, and Heparin still 
infusing, tPA discontinued ;  No s/s of distress noted. New orders to pull sheath when ACT 
<150 ; ACT resulted 136 at 1700 ; MD Neela notified , MD educated patient and scheduled 
for sx. Consent obtained; MD Delfino removed sheath at 1745; Dressing clean , dry and 
intact, no swelling, edema or bruising at removal site ; Frequwnt monitoring for changes ; 
Hand off given at bedside to transport staff ; VS stable ;, Hand off given to oncoming RN, 
Pt left floor with transport RNs at approx 1930

## 2021-09-24 VITALS — SYSTOLIC BLOOD PRESSURE: 138 MMHG | DIASTOLIC BLOOD PRESSURE: 96 MMHG

## 2021-09-24 VITALS — SYSTOLIC BLOOD PRESSURE: 147 MMHG | DIASTOLIC BLOOD PRESSURE: 86 MMHG

## 2021-09-24 VITALS — DIASTOLIC BLOOD PRESSURE: 82 MMHG | SYSTOLIC BLOOD PRESSURE: 141 MMHG

## 2021-09-24 VITALS — SYSTOLIC BLOOD PRESSURE: 117 MMHG | DIASTOLIC BLOOD PRESSURE: 108 MMHG

## 2021-09-24 VITALS — SYSTOLIC BLOOD PRESSURE: 137 MMHG | DIASTOLIC BLOOD PRESSURE: 78 MMHG

## 2021-09-24 VITALS — DIASTOLIC BLOOD PRESSURE: 88 MMHG | SYSTOLIC BLOOD PRESSURE: 159 MMHG

## 2021-09-24 VITALS — SYSTOLIC BLOOD PRESSURE: 101 MMHG | DIASTOLIC BLOOD PRESSURE: 93 MMHG

## 2021-09-24 VITALS — DIASTOLIC BLOOD PRESSURE: 73 MMHG | SYSTOLIC BLOOD PRESSURE: 125 MMHG

## 2021-09-24 VITALS — SYSTOLIC BLOOD PRESSURE: 155 MMHG | DIASTOLIC BLOOD PRESSURE: 83 MMHG

## 2021-09-24 VITALS — DIASTOLIC BLOOD PRESSURE: 84 MMHG | SYSTOLIC BLOOD PRESSURE: 148 MMHG

## 2021-09-24 VITALS — SYSTOLIC BLOOD PRESSURE: 126 MMHG | DIASTOLIC BLOOD PRESSURE: 73 MMHG

## 2021-09-24 VITALS — DIASTOLIC BLOOD PRESSURE: 106 MMHG | SYSTOLIC BLOOD PRESSURE: 154 MMHG

## 2021-09-24 VITALS — SYSTOLIC BLOOD PRESSURE: 128 MMHG | DIASTOLIC BLOOD PRESSURE: 79 MMHG

## 2021-09-24 VITALS — DIASTOLIC BLOOD PRESSURE: 88 MMHG | SYSTOLIC BLOOD PRESSURE: 149 MMHG

## 2021-09-24 VITALS — DIASTOLIC BLOOD PRESSURE: 86 MMHG | SYSTOLIC BLOOD PRESSURE: 138 MMHG

## 2021-09-24 VITALS — DIASTOLIC BLOOD PRESSURE: 94 MMHG | SYSTOLIC BLOOD PRESSURE: 150 MMHG

## 2021-09-24 VITALS — DIASTOLIC BLOOD PRESSURE: 85 MMHG | SYSTOLIC BLOOD PRESSURE: 157 MMHG

## 2021-09-24 VITALS — SYSTOLIC BLOOD PRESSURE: 140 MMHG | DIASTOLIC BLOOD PRESSURE: 78 MMHG

## 2021-09-24 VITALS — SYSTOLIC BLOOD PRESSURE: 124 MMHG | DIASTOLIC BLOOD PRESSURE: 85 MMHG

## 2021-09-24 VITALS — SYSTOLIC BLOOD PRESSURE: 136 MMHG | DIASTOLIC BLOOD PRESSURE: 80 MMHG

## 2021-09-24 LAB
BASOPHILS # BLD AUTO: 0 X10'3 (ref 0–0.2)
BASOPHILS NFR BLD AUTO: 0 % (ref 0–1)
EOSINOPHIL # BLD AUTO: 0 X10'3 (ref 0–0.9)
EOSINOPHIL NFR BLD AUTO: 0 % (ref 0–6)
ERYTHROCYTE [DISTWIDTH] IN BLOOD BY AUTOMATED COUNT: 15.5 % (ref 11.5–14.5)
HCT VFR BLD AUTO: 41.5 % (ref 42–52)
HGB BLD-MCNC: 13.6 G/DL (ref 14–17.9)
LYMPHOCYTES # BLD AUTO: 0.5 X10'3 (ref 1.1–4.8)
LYMPHOCYTES NFR BLD AUTO: 3.4 % (ref 21–51)
MCH RBC QN AUTO: 28.5 PG (ref 27–31)
MCHC RBC AUTO-ENTMCNC: 32.9 G/DL (ref 33–36.5)
MCV RBC AUTO: 86.6 FL (ref 78–98)
MONOCYTES # BLD AUTO: 0.9 X10'3 (ref 0–0.9)
MONOCYTES NFR BLD AUTO: 7.1 % (ref 2–12)
NEUTROPHILS # BLD AUTO: 11.9 X10'3 (ref 1.8–7.7)
NEUTROPHILS NFR BLD AUTO: 89.5 % (ref 42–75)
PLATELET # BLD AUTO: 241 X10'3 (ref 140–440)
PMV BLD AUTO: 7.1 FL (ref 7.4–10.4)
RBC # BLD AUTO: 4.79 X10'6 (ref 4.7–6.1)
WBC # BLD AUTO: 13.3 X10'3 (ref 4.5–11)

## 2021-09-24 RX ADMIN — HYDROMORPHONE HYDROCHLORIDE SCH MLS/HR: 10 INJECTION, SOLUTION INTRAMUSCULAR; INTRAVENOUS; SUBCUTANEOUS at 05:00

## 2021-09-24 RX ADMIN — BUPROPION HYDROCHLORIDE SCH MG: 150 TABLET, FILM COATED, EXTENDED RELEASE ORAL at 19:29

## 2021-09-24 RX ADMIN — HYDROMORPHONE HYDROCHLORIDE SCH MLS/HR: 10 INJECTION, SOLUTION INTRAMUSCULAR; INTRAVENOUS; SUBCUTANEOUS at 01:00

## 2021-09-24 RX ADMIN — ALTEPLASE SCH MLS/HR: 2.2 INJECTION, POWDER, LYOPHILIZED, FOR SOLUTION INTRAVENOUS at 00:21

## 2021-09-24 RX ADMIN — SODIUM CHLORIDE SCH MLS/HR: 9 INJECTION INTRAMUSCULAR; INTRAVENOUS; SUBCUTANEOUS at 18:00

## 2021-09-24 RX ADMIN — CEFAZOLIN SODIUM SCH MLS/HR: 1 SOLUTION INTRAVENOUS at 01:42

## 2021-09-24 RX ADMIN — CEFAZOLIN SODIUM SCH MLS/HR: 1 SOLUTION INTRAVENOUS at 08:48

## 2021-09-24 RX ADMIN — BUPROPION HYDROCHLORIDE SCH MG: 150 TABLET, FILM COATED, EXTENDED RELEASE ORAL at 08:48

## 2021-09-24 RX ADMIN — HYDROMORPHONE HYDROCHLORIDE SCH MLS/HR: 10 INJECTION, SOLUTION INTRAMUSCULAR; INTRAVENOUS; SUBCUTANEOUS at 03:00

## 2021-09-25 VITALS — SYSTOLIC BLOOD PRESSURE: 135 MMHG | DIASTOLIC BLOOD PRESSURE: 87 MMHG

## 2021-09-25 VITALS — DIASTOLIC BLOOD PRESSURE: 75 MMHG | SYSTOLIC BLOOD PRESSURE: 121 MMHG

## 2021-09-25 VITALS — DIASTOLIC BLOOD PRESSURE: 97 MMHG | SYSTOLIC BLOOD PRESSURE: 133 MMHG

## 2021-09-25 VITALS — DIASTOLIC BLOOD PRESSURE: 73 MMHG | SYSTOLIC BLOOD PRESSURE: 126 MMHG

## 2021-09-25 VITALS — SYSTOLIC BLOOD PRESSURE: 121 MMHG | DIASTOLIC BLOOD PRESSURE: 83 MMHG

## 2021-09-25 VITALS — SYSTOLIC BLOOD PRESSURE: 124 MMHG | DIASTOLIC BLOOD PRESSURE: 74 MMHG

## 2021-09-25 VITALS — DIASTOLIC BLOOD PRESSURE: 80 MMHG | SYSTOLIC BLOOD PRESSURE: 134 MMHG

## 2021-09-25 VITALS — SYSTOLIC BLOOD PRESSURE: 142 MMHG | DIASTOLIC BLOOD PRESSURE: 87 MMHG

## 2021-09-25 VITALS — DIASTOLIC BLOOD PRESSURE: 84 MMHG | SYSTOLIC BLOOD PRESSURE: 125 MMHG

## 2021-09-25 VITALS — SYSTOLIC BLOOD PRESSURE: 116 MMHG | DIASTOLIC BLOOD PRESSURE: 69 MMHG

## 2021-09-25 VITALS — SYSTOLIC BLOOD PRESSURE: 134 MMHG | DIASTOLIC BLOOD PRESSURE: 81 MMHG

## 2021-09-25 VITALS — DIASTOLIC BLOOD PRESSURE: 87 MMHG | SYSTOLIC BLOOD PRESSURE: 133 MMHG

## 2021-09-25 VITALS — SYSTOLIC BLOOD PRESSURE: 122 MMHG | DIASTOLIC BLOOD PRESSURE: 78 MMHG

## 2021-09-25 VITALS — SYSTOLIC BLOOD PRESSURE: 130 MMHG | DIASTOLIC BLOOD PRESSURE: 77 MMHG

## 2021-09-25 VITALS — SYSTOLIC BLOOD PRESSURE: 149 MMHG | DIASTOLIC BLOOD PRESSURE: 91 MMHG

## 2021-09-25 VITALS — SYSTOLIC BLOOD PRESSURE: 127 MMHG | DIASTOLIC BLOOD PRESSURE: 76 MMHG

## 2021-09-25 LAB
ALBUMIN SERPL BCP-MCNC: 2 G/DL (ref 3.4–5)
ANION GAP SERPL CALCULATED.3IONS-SCNC: 2 MMOL/L (ref 8–16)
BASOPHILS # BLD AUTO: 0.1 X10'3 (ref 0–0.2)
BASOPHILS NFR BLD AUTO: 1 % (ref 0–1)
BUN SERPL-MCNC: 19 MG/DL (ref 7–18)
BUN/CREAT SERPL: 19.8 (ref 5.4–32)
CALCIUM SERPL-MCNC: 8 MG/DL (ref 8.5–10.1)
CHLORIDE SERPL-SCNC: 100 MMOL/L (ref 99–107)
CO2 SERPL-SCNC: 27.7 MMOL/L (ref 24–32)
CREAT SERPL-MCNC: 0.96 MG/DL (ref 0.6–1.1)
EOSINOPHIL # BLD AUTO: 0.1 X10'3 (ref 0–0.9)
EOSINOPHIL NFR BLD AUTO: 0.6 % (ref 0–6)
ERYTHROCYTE [DISTWIDTH] IN BLOOD BY AUTOMATED COUNT: 15.6 % (ref 11.5–14.5)
GFR SERPL CREATININE-BSD FRML MDRD: 80 ML/MIN
GLUCOSE SERPL-MCNC: 115 MG/DL (ref 70–104)
HCT VFR BLD AUTO: 36.6 % (ref 42–52)
HGB BLD-MCNC: 12.1 G/DL (ref 14–17.9)
LYMPHOCYTES # BLD AUTO: 1 X10'3 (ref 1.1–4.8)
LYMPHOCYTES NFR BLD AUTO: 8.2 % (ref 21–51)
MCH RBC QN AUTO: 28.4 PG (ref 27–31)
MCHC RBC AUTO-ENTMCNC: 32.9 G/DL (ref 33–36.5)
MCV RBC AUTO: 86.2 FL (ref 78–98)
MONOCYTES # BLD AUTO: 1.7 X10'3 (ref 0–0.9)
MONOCYTES NFR BLD AUTO: 14 % (ref 2–12)
NEUTROPHILS # BLD AUTO: 9.2 X10'3 (ref 1.8–7.7)
NEUTROPHILS NFR BLD AUTO: 76.2 % (ref 42–75)
PLATELET # BLD AUTO: 253 X10'3 (ref 140–440)
PMV BLD AUTO: 7 FL (ref 7.4–10.4)
POTASSIUM SERPL-SCNC: 4 MMOL/L (ref 3.5–5.1)
RBC # BLD AUTO: 4.25 X10'6 (ref 4.7–6.1)
SODIUM SERPL-SCNC: 130 MMOL/L (ref 135–145)
WBC # BLD AUTO: 12 X10'3 (ref 4.5–11)

## 2021-09-25 RX ADMIN — HYDROMORPHONE HYDROCHLORIDE SCH MLS/HR: 10 INJECTION, SOLUTION INTRAMUSCULAR; INTRAVENOUS; SUBCUTANEOUS at 19:00

## 2021-09-25 RX ADMIN — HYDROMORPHONE HYDROCHLORIDE SCH MLS/HR: 10 INJECTION, SOLUTION INTRAMUSCULAR; INTRAVENOUS; SUBCUTANEOUS at 17:00

## 2021-09-25 RX ADMIN — HYDROMORPHONE HYDROCHLORIDE SCH MLS/HR: 10 INJECTION, SOLUTION INTRAMUSCULAR; INTRAVENOUS; SUBCUTANEOUS at 23:00

## 2021-09-25 RX ADMIN — SODIUM CHLORIDE SCH MLS/HR: 9 INJECTION INTRAMUSCULAR; INTRAVENOUS; SUBCUTANEOUS at 14:52

## 2021-09-25 RX ADMIN — HYDROMORPHONE HYDROCHLORIDE SCH MLS/HR: 10 INJECTION, SOLUTION INTRAMUSCULAR; INTRAVENOUS; SUBCUTANEOUS at 13:00

## 2021-09-25 RX ADMIN — PANTOPRAZOLE SODIUM SCH MG: 40 TABLET, DELAYED RELEASE ORAL at 08:11

## 2021-09-25 RX ADMIN — HYDROMORPHONE HYDROCHLORIDE SCH MLS/HR: 10 INJECTION, SOLUTION INTRAMUSCULAR; INTRAVENOUS; SUBCUTANEOUS at 11:00

## 2021-09-25 RX ADMIN — PANTOPRAZOLE SODIUM SCH MG: 40 TABLET, DELAYED RELEASE ORAL at 08:13

## 2021-09-25 RX ADMIN — BUPROPION HYDROCHLORIDE SCH MG: 150 TABLET, FILM COATED, EXTENDED RELEASE ORAL at 20:11

## 2021-09-25 RX ADMIN — HYDROMORPHONE HYDROCHLORIDE SCH MLS/HR: 10 INJECTION, SOLUTION INTRAMUSCULAR; INTRAVENOUS; SUBCUTANEOUS at 21:00

## 2021-09-25 RX ADMIN — BUPROPION HYDROCHLORIDE SCH MG: 150 TABLET, FILM COATED, EXTENDED RELEASE ORAL at 08:12

## 2021-09-25 NOTE — NUR
Patient just arrived to his new room accompanied by ICU nurse. Patient has a wound vac 
connected to his left calf wound. Wound vac drainage was sanguinous. Dilaudid CADD setting 
was checked upon arrival. Call light was given to patient to call for assistance.

## 2021-09-25 NOTE — NUR
Patient in room [facility]. I have received report from JOSH RUIZ   and had the opportunity to 
ask questions and assume patient care.

-------------------------------------------------------------------------------

Addendum: 09/25/21 at 1842 by Rossana Burris RN

-------------------------------------------------------------------------------

Amended: Links added.

## 2021-09-25 NOTE — NUR
Problems reprioritized. Patient report given, questions answered & plan of care reviewed 
with Rossana RUIZ.

## 2021-09-26 VITALS — DIASTOLIC BLOOD PRESSURE: 77 MMHG | SYSTOLIC BLOOD PRESSURE: 123 MMHG

## 2021-09-26 VITALS — SYSTOLIC BLOOD PRESSURE: 129 MMHG | DIASTOLIC BLOOD PRESSURE: 74 MMHG

## 2021-09-26 VITALS — SYSTOLIC BLOOD PRESSURE: 133 MMHG | DIASTOLIC BLOOD PRESSURE: 77 MMHG

## 2021-09-26 VITALS — DIASTOLIC BLOOD PRESSURE: 69 MMHG | SYSTOLIC BLOOD PRESSURE: 128 MMHG

## 2021-09-26 RX ADMIN — HYDROMORPHONE HYDROCHLORIDE SCH MLS/HR: 10 INJECTION, SOLUTION INTRAMUSCULAR; INTRAVENOUS; SUBCUTANEOUS at 12:52

## 2021-09-26 RX ADMIN — HYDROMORPHONE HYDROCHLORIDE SCH MLS/HR: 10 INJECTION, SOLUTION INTRAMUSCULAR; INTRAVENOUS; SUBCUTANEOUS at 06:51

## 2021-09-26 RX ADMIN — HYDROMORPHONE HYDROCHLORIDE SCH MLS/HR: 10 INJECTION, SOLUTION INTRAMUSCULAR; INTRAVENOUS; SUBCUTANEOUS at 18:45

## 2021-09-26 RX ADMIN — HYDROMORPHONE HYDROCHLORIDE SCH MLS/HR: 10 INJECTION, SOLUTION INTRAMUSCULAR; INTRAVENOUS; SUBCUTANEOUS at 11:00

## 2021-09-26 RX ADMIN — BUPROPION HYDROCHLORIDE SCH MG: 150 TABLET, FILM COATED, EXTENDED RELEASE ORAL at 07:25

## 2021-09-26 RX ADMIN — HYDROMORPHONE HYDROCHLORIDE SCH MLS/HR: 10 INJECTION, SOLUTION INTRAMUSCULAR; INTRAVENOUS; SUBCUTANEOUS at 17:00

## 2021-09-26 RX ADMIN — PANTOPRAZOLE SODIUM SCH MG: 40 TABLET, DELAYED RELEASE ORAL at 07:25

## 2021-09-26 RX ADMIN — HYDROMORPHONE HYDROCHLORIDE SCH MLS/HR: 10 INJECTION, SOLUTION INTRAMUSCULAR; INTRAVENOUS; SUBCUTANEOUS at 15:00

## 2021-09-26 RX ADMIN — HYDROMORPHONE HYDROCHLORIDE SCH MLS/HR: 10 INJECTION, SOLUTION INTRAMUSCULAR; INTRAVENOUS; SUBCUTANEOUS at 23:00

## 2021-09-26 RX ADMIN — HYDROMORPHONE HYDROCHLORIDE SCH MLS/HR: 10 INJECTION, SOLUTION INTRAMUSCULAR; INTRAVENOUS; SUBCUTANEOUS at 21:00

## 2021-09-26 RX ADMIN — HYDROMORPHONE HYDROCHLORIDE SCH MLS/HR: 10 INJECTION, SOLUTION INTRAMUSCULAR; INTRAVENOUS; SUBCUTANEOUS at 09:00

## 2021-09-26 RX ADMIN — HYDROMORPHONE HYDROCHLORIDE SCH MLS/HR: 10 INJECTION, SOLUTION INTRAMUSCULAR; INTRAVENOUS; SUBCUTANEOUS at 03:00

## 2021-09-26 RX ADMIN — HYDROMORPHONE HYDROCHLORIDE SCH MLS/HR: 10 INJECTION, SOLUTION INTRAMUSCULAR; INTRAVENOUS; SUBCUTANEOUS at 01:00

## 2021-09-26 RX ADMIN — SODIUM CHLORIDE SCH MLS/HR: 9 INJECTION INTRAMUSCULAR; INTRAVENOUS; SUBCUTANEOUS at 08:08

## 2021-09-26 RX ADMIN — BUPROPION HYDROCHLORIDE SCH MG: 150 TABLET, FILM COATED, EXTENDED RELEASE ORAL at 19:35

## 2021-09-26 RX ADMIN — HYDROMORPHONE HYDROCHLORIDE SCH MLS/HR: 10 INJECTION, SOLUTION INTRAMUSCULAR; INTRAVENOUS; SUBCUTANEOUS at 05:00

## 2021-09-26 NOTE — NUR
Problems reprioritized. Patient report given, questions answered & plan of care reviewed 
with ZECHARIAH RUIZ. 

-------------------------------------------------------------------------------

Addendum: 09/26/21 at 0622 by Rossana Burris RN

-------------------------------------------------------------------------------

Amended: Links added.

## 2021-09-26 NOTE — NUR
Student documentation:

I have reviewed and agree with all interventions, assessments performed and documented by 
JOSE RUIZ STUDENT LAMONT VALDES.

-------------------------------------------------------------------------------

Addendum: 09/26/21 at 0622 by Rossana Burris RN

-------------------------------------------------------------------------------

Amended: Links added.

## 2021-09-26 NOTE — NUR
Patient in room [facility] 346. I have received report from  GARTH RUIZ  and had the opportunity 
to ask questions and assume patient care.

-------------------------------------------------------------------------------

Addendum: 09/26/21 at 1810 by Rossana Burris RN

-------------------------------------------------------------------------------

Amended: Links added.

## 2021-09-26 NOTE — NUR
Problems reprioritized. Patient report given, questions answered & plan of care reviewed 
with SHAMA RUIZ.

## 2021-09-26 NOTE — NUR
Initial: Pt admitted w/ increasing L leg pain, underwent tibial 

thrombectomy 9/23 w/ wound vac in place per EMR. Pt able to eat well, avg 

100% of meals on regular diet meeting needs. LBM 9/26. No nutritional 

intervention implemented at this time, will continue to monitor. 

Recs:

1. Continue Regular diet as tolerated

2. Bowel care per rx

3. Scaled wt this admit, weekly wts thereafter

-------------------------------------------------------------------------------

Addendum: 09/26/21 at 1107 by Cholo Sigala RD

-------------------------------------------------------------------------------

Amended: Links added.

## 2021-09-27 VITALS — DIASTOLIC BLOOD PRESSURE: 86 MMHG | SYSTOLIC BLOOD PRESSURE: 139 MMHG

## 2021-09-27 VITALS — SYSTOLIC BLOOD PRESSURE: 118 MMHG | DIASTOLIC BLOOD PRESSURE: 83 MMHG

## 2021-09-27 VITALS — SYSTOLIC BLOOD PRESSURE: 139 MMHG | DIASTOLIC BLOOD PRESSURE: 88 MMHG

## 2021-09-27 VITALS — SYSTOLIC BLOOD PRESSURE: 130 MMHG | DIASTOLIC BLOOD PRESSURE: 86 MMHG

## 2021-09-27 VITALS — SYSTOLIC BLOOD PRESSURE: 118 MMHG | DIASTOLIC BLOOD PRESSURE: 79 MMHG

## 2021-09-27 RX ADMIN — SODIUM CHLORIDE SCH MLS/HR: 9 INJECTION INTRAMUSCULAR; INTRAVENOUS; SUBCUTANEOUS at 21:51

## 2021-09-27 RX ADMIN — HYDROMORPHONE HYDROCHLORIDE SCH MLS/HR: 10 INJECTION, SOLUTION INTRAMUSCULAR; INTRAVENOUS; SUBCUTANEOUS at 20:55

## 2021-09-27 RX ADMIN — BUPROPION HYDROCHLORIDE SCH MG: 150 TABLET, FILM COATED, EXTENDED RELEASE ORAL at 07:53

## 2021-09-27 RX ADMIN — HYDROMORPHONE HYDROCHLORIDE SCH MLS/HR: 10 INJECTION, SOLUTION INTRAMUSCULAR; INTRAVENOUS; SUBCUTANEOUS at 19:00

## 2021-09-27 RX ADMIN — HYDROMORPHONE HYDROCHLORIDE SCH MLS/HR: 10 INJECTION, SOLUTION INTRAMUSCULAR; INTRAVENOUS; SUBCUTANEOUS at 15:00

## 2021-09-27 RX ADMIN — SODIUM CHLORIDE SCH MLS/HR: 9 INJECTION INTRAMUSCULAR; INTRAVENOUS; SUBCUTANEOUS at 03:01

## 2021-09-27 RX ADMIN — BUPROPION HYDROCHLORIDE SCH MG: 150 TABLET, FILM COATED, EXTENDED RELEASE ORAL at 20:47

## 2021-09-27 RX ADMIN — HYDROMORPHONE HYDROCHLORIDE SCH MLS/HR: 10 INJECTION, SOLUTION INTRAMUSCULAR; INTRAVENOUS; SUBCUTANEOUS at 11:00

## 2021-09-27 RX ADMIN — Medication PRN MG: at 03:29

## 2021-09-27 RX ADMIN — HYDROMORPHONE HYDROCHLORIDE SCH MLS/HR: 10 INJECTION, SOLUTION INTRAMUSCULAR; INTRAVENOUS; SUBCUTANEOUS at 21:51

## 2021-09-27 RX ADMIN — HYDROMORPHONE HYDROCHLORIDE SCH MLS/HR: 10 INJECTION, SOLUTION INTRAMUSCULAR; INTRAVENOUS; SUBCUTANEOUS at 17:00

## 2021-09-27 RX ADMIN — PANTOPRAZOLE SODIUM SCH MG: 40 TABLET, DELAYED RELEASE ORAL at 07:53

## 2021-09-27 RX ADMIN — HYDROMORPHONE HYDROCHLORIDE SCH MLS/HR: 10 INJECTION, SOLUTION INTRAMUSCULAR; INTRAVENOUS; SUBCUTANEOUS at 05:00

## 2021-09-27 RX ADMIN — HYDROMORPHONE HYDROCHLORIDE SCH MLS/HR: 10 INJECTION, SOLUTION INTRAMUSCULAR; INTRAVENOUS; SUBCUTANEOUS at 07:00

## 2021-09-27 RX ADMIN — HYDROMORPHONE HYDROCHLORIDE SCH MLS/HR: 10 INJECTION, SOLUTION INTRAMUSCULAR; INTRAVENOUS; SUBCUTANEOUS at 13:00

## 2021-09-27 RX ADMIN — HYDROMORPHONE HYDROCHLORIDE SCH MLS/HR: 10 INJECTION, SOLUTION INTRAMUSCULAR; INTRAVENOUS; SUBCUTANEOUS at 09:00

## 2021-09-27 RX ADMIN — HYDROMORPHONE HYDROCHLORIDE SCH MLS/HR: 10 INJECTION, SOLUTION INTRAMUSCULAR; INTRAVENOUS; SUBCUTANEOUS at 01:00

## 2021-09-27 RX ADMIN — HYDROMORPHONE HYDROCHLORIDE SCH MLS/HR: 10 INJECTION, SOLUTION INTRAMUSCULAR; INTRAVENOUS; SUBCUTANEOUS at 03:04

## 2021-09-27 NOTE — NUR
Patient in room [facility] 346A. I have received report from JOSEPH MICHAELS and had the opportunity to 
ask questions and assume patient care.

## 2021-09-27 NOTE — NUR
Problems reprioritized. Patient report given, questions answered & plan of care reviewed 
with KAIDEN RUIZ. 

-------------------------------------------------------------------------------

Addendum: 09/27/21 at 1832 by Rossana Burris RN

-------------------------------------------------------------------------------

Amended: Links added.

## 2021-09-27 NOTE — NUR
Problems reprioritized. Patient report given, questions answered & plan of care reviewed 
with JOSEPH MICHAELS.

## 2021-09-27 NOTE — NUR
up to bedside commode to void and stool then pt assisted back to chair leg elevated and 
tshirt on instead of a gown per pt request.

## 2021-09-27 NOTE — NUR
Problems reprioritized. Patient report given, questions answered & plan of care reviewed 
with KAIDEN RUIZ. 

-------------------------------------------------------------------------------

Addendum: 09/27/21 at 0634 by Rossana Burris RN

-------------------------------------------------------------------------------

Amended: Links added.

## 2021-09-27 NOTE — NUR
WOUND VAC EDUCATION PROVIDED BY WOUND CARE



1.  Patient instructed to call the Wound Center or their Home Health Agency immediately if:

    * They notice a change in the color or amount of the fluid in the canister.

    * Their wound looks more red than usual or has a foul smell.

    * The skin around their wound looks reddened or irritated.

    * The dressing feels loose or appears to be loose.

    * They experience any increase or changes in their pain.

    * The alarm will not turn off.



2.  Patient instructed that they should not be disconnected from suction for more than 2 
hours at a time.

    * If they are not able to get the suction back on, they need to remove the dressing and 
take all of the foam out of the wound. 

    * Then moisten sterile gauze with normal saline and place on/in the wound. 

    * Change the dressing once a day until arrangements have been made to replace the wound 
vac dressing. 



3.  Patient instructed to turn the wound vac machine OFF and call 911 or go to the ED 
immediately if their canister fills rapidly with blood.



4.  If any of these occur while in the hospital tell a nurse immediately.  

 

-------------------------------------------------------------------------------

Addendum: 09/27/21 at 1539 by Jay Valadez RN

-------------------------------------------------------------------------------

Amended: Links added.

## 2021-09-27 NOTE — NUR
Student documentation:

I have reviewed and agree with all interventions, assessments performed and documented by 
LAMONT GARCIA RN STUDENT.

-------------------------------------------------------------------------------

Addendum: 09/27/21 at 0528 by Rossana Burris RN

-------------------------------------------------------------------------------

Amended: Links added.

## 2021-09-27 NOTE — NUR
resting eyes closed using urinal to void, no drainage to left leg wound wound vac in place 
at 75mmhg. pt stated tonight leg more painful and using Dilaudid cadd for pain control. 
appears comfortable at this time.

## 2021-09-28 VITALS — SYSTOLIC BLOOD PRESSURE: 110 MMHG | DIASTOLIC BLOOD PRESSURE: 83 MMHG

## 2021-09-28 VITALS — DIASTOLIC BLOOD PRESSURE: 75 MMHG | SYSTOLIC BLOOD PRESSURE: 137 MMHG

## 2021-09-28 VITALS — SYSTOLIC BLOOD PRESSURE: 122 MMHG | DIASTOLIC BLOOD PRESSURE: 92 MMHG

## 2021-09-28 RX ADMIN — BUPROPION HYDROCHLORIDE SCH MG: 150 TABLET, FILM COATED, EXTENDED RELEASE ORAL at 07:40

## 2021-09-28 RX ADMIN — HYDROMORPHONE HYDROCHLORIDE SCH MLS/HR: 10 INJECTION, SOLUTION INTRAMUSCULAR; INTRAVENOUS; SUBCUTANEOUS at 20:48

## 2021-09-28 RX ADMIN — HYDROMORPHONE HYDROCHLORIDE SCH MLS/HR: 10 INJECTION, SOLUTION INTRAMUSCULAR; INTRAVENOUS; SUBCUTANEOUS at 05:00

## 2021-09-28 RX ADMIN — HYDROMORPHONE HYDROCHLORIDE SCH MLS/HR: 10 INJECTION, SOLUTION INTRAMUSCULAR; INTRAVENOUS; SUBCUTANEOUS at 18:43

## 2021-09-28 RX ADMIN — HYDROMORPHONE HYDROCHLORIDE SCH MLS/HR: 10 INJECTION, SOLUTION INTRAMUSCULAR; INTRAVENOUS; SUBCUTANEOUS at 01:00

## 2021-09-28 RX ADMIN — BUPROPION HYDROCHLORIDE SCH MG: 150 TABLET, FILM COATED, EXTENDED RELEASE ORAL at 20:27

## 2021-09-28 RX ADMIN — HYDROMORPHONE HYDROCHLORIDE SCH MLS/HR: 10 INJECTION, SOLUTION INTRAMUSCULAR; INTRAVENOUS; SUBCUTANEOUS at 07:00

## 2021-09-28 RX ADMIN — HYDROMORPHONE HYDROCHLORIDE SCH MLS/HR: 10 INJECTION, SOLUTION INTRAMUSCULAR; INTRAVENOUS; SUBCUTANEOUS at 23:00

## 2021-09-28 RX ADMIN — HYDROMORPHONE HYDROCHLORIDE SCH MLS/HR: 10 INJECTION, SOLUTION INTRAMUSCULAR; INTRAVENOUS; SUBCUTANEOUS at 11:00

## 2021-09-28 RX ADMIN — PANTOPRAZOLE SODIUM SCH MG: 40 TABLET, DELAYED RELEASE ORAL at 07:41

## 2021-09-28 RX ADMIN — HYDROMORPHONE HYDROCHLORIDE SCH MLS/HR: 10 INJECTION, SOLUTION INTRAMUSCULAR; INTRAVENOUS; SUBCUTANEOUS at 03:00

## 2021-09-28 RX ADMIN — HYDROMORPHONE HYDROCHLORIDE SCH MLS/HR: 10 INJECTION, SOLUTION INTRAMUSCULAR; INTRAVENOUS; SUBCUTANEOUS at 15:00

## 2021-09-28 RX ADMIN — HYDROMORPHONE HYDROCHLORIDE SCH MLS/HR: 10 INJECTION, SOLUTION INTRAMUSCULAR; INTRAVENOUS; SUBCUTANEOUS at 13:00

## 2021-09-28 RX ADMIN — HYDROMORPHONE HYDROCHLORIDE SCH MLS/HR: 10 INJECTION, SOLUTION INTRAMUSCULAR; INTRAVENOUS; SUBCUTANEOUS at 09:00

## 2021-09-28 RX ADMIN — SODIUM CHLORIDE SCH MLS/HR: 9 INJECTION INTRAMUSCULAR; INTRAVENOUS; SUBCUTANEOUS at 22:59

## 2021-09-28 RX ADMIN — HYDROMORPHONE HYDROCHLORIDE SCH MLS/HR: 10 INJECTION, SOLUTION INTRAMUSCULAR; INTRAVENOUS; SUBCUTANEOUS at 17:00

## 2021-09-28 NOTE — NUR
Patient in room [facility]. I have received report from Emily michelle   and had the opportunity to 
ask questions and assume patient care.

-------------------------------------------------------------------------------

Addendum: 09/28/21 at 0731 by Ton Carl RN

-------------------------------------------------------------------------------

dilia nurse

## 2021-09-28 NOTE — NUR
RESTING IN RECLINER CHAIR AND LEFT LEG ELEVATED. FEET WARM PALPABLE PULSES USING CADD WITH 
PAIN CONTROL AND HAD 4 BM'S YESTERDAY NONE SO FAR THIS AM.

## 2021-09-28 NOTE — NUR
Problems reprioritized. Patient report given, questions answered & plan of care reviewed 
with alessio michelle.

## 2021-09-28 NOTE — NUR
Problems reprioritized. Patient report given, questions answered & plan of care reviewed 
with GARTH RUIZ. 

-------------------------------------------------------------------------------

Addendum: 09/28/21 at 0603 by Rossana Burris RN

-------------------------------------------------------------------------------

Amended: Links added.

## 2021-09-28 NOTE — NUR
skin intact to sacrum, pt states turning and moving self freq.  up to [facility] pt luis 
well.

-------------------------------------------------------------------------------

Addendum: 09/28/21 at 2218 by Irving Soria RN

-------------------------------------------------------------------------------

Amended: Links added.

## 2021-09-28 NOTE — NUR
Patient in room [facility] 346. I have received report from JOSEPH Lozoya   and had the opportunity 
to ask questions and assume patient care.

-------------------------------------------------------------------------------

Addendum: 09/28/21 at 2216 by Irving Soria RN

-------------------------------------------------------------------------------

Amended: Links added.

## 2021-09-28 NOTE — NUR
Patient in room [facility] 346. I have received report from  Rossana RUIZ  and had the opportunity to 
ask questions and assume patient care.

## 2021-09-29 VITALS — SYSTOLIC BLOOD PRESSURE: 128 MMHG | DIASTOLIC BLOOD PRESSURE: 75 MMHG

## 2021-09-29 VITALS — DIASTOLIC BLOOD PRESSURE: 77 MMHG | SYSTOLIC BLOOD PRESSURE: 133 MMHG

## 2021-09-29 VITALS — SYSTOLIC BLOOD PRESSURE: 136 MMHG | DIASTOLIC BLOOD PRESSURE: 77 MMHG

## 2021-09-29 VITALS — DIASTOLIC BLOOD PRESSURE: 77 MMHG | SYSTOLIC BLOOD PRESSURE: 107 MMHG

## 2021-09-29 RX ADMIN — BUPROPION HYDROCHLORIDE SCH MG: 150 TABLET, FILM COATED, EXTENDED RELEASE ORAL at 20:11

## 2021-09-29 RX ADMIN — BUPROPION HYDROCHLORIDE SCH MG: 150 TABLET, FILM COATED, EXTENDED RELEASE ORAL at 10:02

## 2021-09-29 RX ADMIN — HYDROMORPHONE HYDROCHLORIDE SCH MLS/HR: 10 INJECTION, SOLUTION INTRAMUSCULAR; INTRAVENOUS; SUBCUTANEOUS at 05:00

## 2021-09-29 RX ADMIN — Medication PRN MG: at 15:47

## 2021-09-29 RX ADMIN — OXYCODONE HYDROCHLORIDE AND ACETAMINOPHEN PRN TAB: 5; 325 TABLET ORAL at 15:35

## 2021-09-29 RX ADMIN — HYDROMORPHONE HYDROCHLORIDE SCH MLS/HR: 10 INJECTION, SOLUTION INTRAMUSCULAR; INTRAVENOUS; SUBCUTANEOUS at 09:00

## 2021-09-29 RX ADMIN — OXYCODONE HYDROCHLORIDE AND ACETAMINOPHEN PRN TAB: 5; 325 TABLET ORAL at 20:12

## 2021-09-29 RX ADMIN — SODIUM CHLORIDE SCH MLS/HR: 9 INJECTION INTRAMUSCULAR; INTRAVENOUS; SUBCUTANEOUS at 18:00

## 2021-09-29 RX ADMIN — HYDROMORPHONE HYDROCHLORIDE SCH MLS/HR: 10 INJECTION, SOLUTION INTRAMUSCULAR; INTRAVENOUS; SUBCUTANEOUS at 11:00

## 2021-09-29 RX ADMIN — HYDROMORPHONE HYDROCHLORIDE SCH MLS/HR: 10 INJECTION, SOLUTION INTRAMUSCULAR; INTRAVENOUS; SUBCUTANEOUS at 07:00

## 2021-09-29 RX ADMIN — HYDROMORPHONE HYDROCHLORIDE SCH MLS/HR: 10 INJECTION, SOLUTION INTRAMUSCULAR; INTRAVENOUS; SUBCUTANEOUS at 01:00

## 2021-09-29 RX ADMIN — PANTOPRAZOLE SODIUM SCH MG: 40 TABLET, DELAYED RELEASE ORAL at 10:02

## 2021-09-29 RX ADMIN — HYDROMORPHONE HYDROCHLORIDE SCH MLS/HR: 10 INJECTION, SOLUTION INTRAMUSCULAR; INTRAVENOUS; SUBCUTANEOUS at 03:00

## 2021-09-29 NOTE — NUR
Problems reprioritized. Patient report given, questions answered & plan of care reviewed 
with JOSEPH WAKEFIELD. 

-------------------------------------------------------------------------------

Addendum: 09/29/21 at 0644 by Irving Soria RN

-------------------------------------------------------------------------------

Amended: Links added.

## 2021-09-30 VITALS — DIASTOLIC BLOOD PRESSURE: 87 MMHG | SYSTOLIC BLOOD PRESSURE: 137 MMHG

## 2021-09-30 VITALS — DIASTOLIC BLOOD PRESSURE: 72 MMHG | SYSTOLIC BLOOD PRESSURE: 126 MMHG

## 2021-09-30 VITALS — SYSTOLIC BLOOD PRESSURE: 134 MMHG | DIASTOLIC BLOOD PRESSURE: 74 MMHG

## 2021-09-30 VITALS — DIASTOLIC BLOOD PRESSURE: 87 MMHG | SYSTOLIC BLOOD PRESSURE: 149 MMHG

## 2021-09-30 LAB
ALBUMIN SERPL BCP-MCNC: 2.3 G/DL (ref 3.4–5)
ALBUMIN/GLOB SERPL: 0.5 {RATIO} (ref 1.1–1.5)
ALP SERPL-CCNC: 131 IU/L (ref 46–116)
ALT SERPL W P-5'-P-CCNC: 47 U/L (ref 12–78)
ANION GAP SERPL CALCULATED.3IONS-SCNC: 11 MMOL/L (ref 8–16)
AST SERPL W P-5'-P-CCNC: 22 U/L (ref 10–37)
BASOPHILS # BLD AUTO: 0.1 X10'3 (ref 0–0.2)
BASOPHILS NFR BLD AUTO: 0.4 % (ref 0–1)
BILIRUB SERPL-MCNC: 0.4 MG/DL (ref 0.1–1)
BUN SERPL-MCNC: 16 MG/DL (ref 7–18)
BUN/CREAT SERPL: 14.8 (ref 5.4–32)
CALCIUM SERPL-MCNC: 8.4 MG/DL (ref 8.5–10.1)
CHLORIDE SERPL-SCNC: 104 MMOL/L (ref 99–107)
CO2 SERPL-SCNC: 25.9 MMOL/L (ref 24–32)
CREAT SERPL-MCNC: 1.08 MG/DL (ref 0.6–1.1)
EOSINOPHIL # BLD AUTO: 0.4 X10'3 (ref 0–0.9)
EOSINOPHIL NFR BLD AUTO: 2.9 % (ref 0–6)
ERYTHROCYTE [DISTWIDTH] IN BLOOD BY AUTOMATED COUNT: 15.6 % (ref 11.5–14.5)
GFR SERPL CREATININE-BSD FRML MDRD: 70 ML/MIN
GLUCOSE SERPL-MCNC: 113 MG/DL (ref 70–104)
HCT VFR BLD AUTO: 35.1 % (ref 42–52)
HGB BLD-MCNC: 11.9 G/DL (ref 14–17.9)
LYMPHOCYTES # BLD AUTO: 0.7 X10'3 (ref 1.1–4.8)
LYMPHOCYTES NFR BLD AUTO: 5.6 % (ref 21–51)
MCH RBC QN AUTO: 28.5 PG (ref 27–31)
MCHC RBC AUTO-ENTMCNC: 33.9 G/DL (ref 33–36.5)
MCV RBC AUTO: 84.2 FL (ref 78–98)
MONOCYTES # BLD AUTO: 1 X10'3 (ref 0–0.9)
MONOCYTES NFR BLD AUTO: 8.2 % (ref 2–12)
NEUTROPHILS # BLD AUTO: 10.2 X10'3 (ref 1.8–7.7)
NEUTROPHILS NFR BLD AUTO: 82.9 % (ref 42–75)
PLATELET # BLD AUTO: 464 X10'3 (ref 140–440)
PMV BLD AUTO: 6.7 FL (ref 7.4–10.4)
POTASSIUM SERPL-SCNC: 4.1 MMOL/L (ref 3.5–5.1)
PROT SERPL-MCNC: 6.6 G/DL (ref 6.4–8.2)
RBC # BLD AUTO: 4.17 X10'6 (ref 4.7–6.1)
SODIUM SERPL-SCNC: 141 MMOL/L (ref 135–145)
WBC # BLD AUTO: 12.4 X10'3 (ref 4.5–11)

## 2021-09-30 RX ADMIN — PANTOPRAZOLE SODIUM SCH MG: 40 TABLET, DELAYED RELEASE ORAL at 07:37

## 2021-09-30 RX ADMIN — BUPROPION HYDROCHLORIDE SCH MG: 150 TABLET, FILM COATED, EXTENDED RELEASE ORAL at 19:33

## 2021-09-30 RX ADMIN — OXYCODONE HYDROCHLORIDE AND ACETAMINOPHEN PRN TAB: 5; 325 TABLET ORAL at 07:35

## 2021-09-30 RX ADMIN — OXYCODONE HYDROCHLORIDE AND ACETAMINOPHEN PRN TAB: 5; 325 TABLET ORAL at 22:08

## 2021-09-30 RX ADMIN — OXYCODONE HYDROCHLORIDE AND ACETAMINOPHEN PRN TAB: 5; 325 TABLET ORAL at 11:51

## 2021-09-30 RX ADMIN — BUPROPION HYDROCHLORIDE SCH MG: 150 TABLET, FILM COATED, EXTENDED RELEASE ORAL at 07:36

## 2021-09-30 RX ADMIN — SODIUM CHLORIDE SCH MLS/HR: 9 INJECTION INTRAMUSCULAR; INTRAVENOUS; SUBCUTANEOUS at 17:59

## 2021-09-30 RX ADMIN — OXYCODONE HYDROCHLORIDE AND ACETAMINOPHEN PRN TAB: 5; 325 TABLET ORAL at 18:02

## 2021-09-30 NOTE — NUR
Labs done patients pain meds addressed. patient will go to [facility] 10/1/21. Ambulated 300ft. 
wound vac in place 10mls drainage. dressing to left calf changed. patient appears 
comfortable at time of report. Report given to Oscar RUIZ

## 2021-09-30 NOTE — NUR
patient on assessment this am stated he had not had pain relief since 2000hrs, appeared 
painful and chilled. Dopplar appled to left foot good pulse sound observed +2 odema to lower 
calf and upper foot. patient repositioned back in bed and percocet given for pain. Dressing 
changed to lateral site. will notify surgeon as labs not done since 09/25 and patient 
requires increase in his dose of pain meds.

## 2021-09-30 NOTE — NUR
no lab work done since 9/25/21. Primary nurse aware and is addressing concern. CLN

-------------------------------------------------------------------------------

Addendum: 09/30/21 at 0837 by Geetha Bagley - Cristal REID

-------------------------------------------------------------------------------

Amended: Links added.

## 2021-09-30 NOTE — NUR
Patient in room [facility] 346. I have received report from  MARTINEZ RUIZ  and had the opportunity to 
ask questions and assume patient care.

## 2021-10-01 VITALS — DIASTOLIC BLOOD PRESSURE: 81 MMHG | SYSTOLIC BLOOD PRESSURE: 105 MMHG

## 2021-10-01 VITALS — SYSTOLIC BLOOD PRESSURE: 115 MMHG | DIASTOLIC BLOOD PRESSURE: 82 MMHG

## 2021-10-01 VITALS — SYSTOLIC BLOOD PRESSURE: 142 MMHG | DIASTOLIC BLOOD PRESSURE: 86 MMHG

## 2021-10-01 RX ADMIN — BUPROPION HYDROCHLORIDE SCH MG: 150 TABLET, FILM COATED, EXTENDED RELEASE ORAL at 09:39

## 2021-10-01 RX ADMIN — OXYCODONE HYDROCHLORIDE AND ACETAMINOPHEN PRN TAB: 5; 325 TABLET ORAL at 13:30

## 2021-10-01 RX ADMIN — PANTOPRAZOLE SODIUM SCH MG: 40 TABLET, DELAYED RELEASE ORAL at 09:39

## 2021-10-01 RX ADMIN — SODIUM CHLORIDE SCH MLS/HR: 9 INJECTION INTRAMUSCULAR; INTRAVENOUS; SUBCUTANEOUS at 10:00

## 2021-10-01 RX ADMIN — OXYCODONE HYDROCHLORIDE AND ACETAMINOPHEN PRN TAB: 5; 325 TABLET ORAL at 05:00

## 2021-10-01 NOTE — NUR
Patient in room [facility] 346. I have received report from Char RUIZ and had the opportunity to ask 
questions and assume patient care.

## 2021-10-01 NOTE — NUR
WOUND VAC EDUCATION PROVIDED BY WOUND CARE



1.  Patient instructed to call the Wound Center or their Home Health Agency immediately if:

    * They notice a change in the color or amount of the fluid in the canister.

    * Their wound looks more red than usual or has a foul smell.

    * The skin around their wound looks reddened or irritated.

    * The dressing feels loose or appears to be loose.

    * They experience any increase or changes in their pain.

    * The alarm will not turn off.



2.  Patient instructed that they should not be disconnected from suction for more than 2 
hours at a time.

    * If they are not able to get the suction back on, they need to remove the dressing and 
take all of the foam out of the wound. 

    * Then moisten sterile gauze with normal saline and place on/in the wound. 

    * Change the dressing once a day until arrangements have been made to replace the wound 
vac dressing. 



3.  Patient instructed to turn the wound vac machine OFF and call 911 or go to the ED 
immediately if their canister fills rapidly with blood.



4.  If any of these occur while in the hospital tell a nurse immediately.  

 

-------------------------------------------------------------------------------

Addendum: 10/01/21 at 1542 by Jay Valadez RN

-------------------------------------------------------------------------------

Amended: Links added.

## 2021-10-01 NOTE — NUR
Patient in room [facility] 346. I have received report from JOSEPH AYALA and had the opportunity to ask 
questions and assume patient care.

## 2021-10-01 NOTE — NUR
WOUND VAC EDUCATION PROVIDED BY WOUND CARE



1.  Patient instructed to call the Wound Center or their Home Health Agency immediately if:

    * They notice a change in the color or amount of the fluid in the canister.

    * Their wound looks more red than usual or has a foul smell.

    * The skin around their wound looks reddened or irritated.

    * The dressing feels loose or appears to be loose.

    * They experience any increase or changes in their pain.

    * The alarm will not turn off.



2.  Patient instructed that they should not be disconnected from suction for more than 2 
hours at a time.

    * If they are not able to get the suction back on, they need to remove the dressing and 
take all of the foam out of the wound. 

    * Then moisten sterile gauze with normal saline and place on/in the wound. 

    * Change the dressing once a day until arrangements have been made to replace the wound 
vac dressing. 



3.  Patient instructed to turn the wound vac machine OFF and call 911 or go to the ED 
immediately if their canister fills rapidly with blood.



4.  If any of these occur while in the hospital tell a nurse immediately.  

 

-------------------------------------------------------------------------------

Addendum: 10/01/21 at 0914 by Jay Valadez RN

-------------------------------------------------------------------------------

Amended: Links added.

## 2021-10-01 NOTE — NUR
Pt transferred to [facility] by [facility]. IV DC'd, tip intact. All 
belongings sent w/pt.

## 2021-10-02 ENCOUNTER — HOSPITAL ENCOUNTER (INPATIENT)
Dept: HOSPITAL 94 - ER | Age: 56
LOS: 3 days | Discharge: HOME | DRG: 921 | End: 2021-10-05
Attending: INTERNAL MEDICINE | Admitting: INTERNAL MEDICINE
Payer: MEDICARE

## 2021-10-02 VITALS — DIASTOLIC BLOOD PRESSURE: 78 MMHG | SYSTOLIC BLOOD PRESSURE: 116 MMHG

## 2021-10-02 VITALS — BODY MASS INDEX: 30.39 KG/M2 | HEIGHT: 73 IN | WEIGHT: 229.28 LBS

## 2021-10-02 VITALS — DIASTOLIC BLOOD PRESSURE: 80 MMHG | SYSTOLIC BLOOD PRESSURE: 124 MMHG
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
## 2021-10-02 DIAGNOSIS — Z90.49: ICD-10-CM

## 2021-10-02 DIAGNOSIS — L76.22: Primary | ICD-10-CM

## 2021-10-02 DIAGNOSIS — Z79.899: ICD-10-CM
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
## 2021-10-02 DIAGNOSIS — F32.A: ICD-10-CM

## 2021-10-02 DIAGNOSIS — D86.9: ICD-10-CM

## 2021-10-02 DIAGNOSIS — Z87.891: ICD-10-CM

## 2021-10-02 DIAGNOSIS — Z85.47: ICD-10-CM

## 2021-10-02 DIAGNOSIS — Z85.048: ICD-10-CM

## 2021-10-02 LAB
ALBUMIN SERPL BCP-MCNC: 2.3 G/DL (ref 3.4–5)
ALBUMIN/GLOB SERPL: 0.5 {RATIO} (ref 1.1–1.5)
ALP SERPL-CCNC: 137 IU/L (ref 46–116)
ALT SERPL W P-5'-P-CCNC: 38 U/L (ref 12–78)
ANION GAP SERPL CALCULATED.3IONS-SCNC: 13 MMOL/L (ref 8–16)
AST SERPL W P-5'-P-CCNC: 19 U/L (ref 10–37)
BASOPHILS # BLD AUTO: 0.1 X10'3 (ref 0–0.2)
BASOPHILS NFR BLD AUTO: 0.7 % (ref 0–1)
BILIRUB SERPL-MCNC: 0.4 MG/DL (ref 0.1–1)
BUN SERPL-MCNC: 16 MG/DL (ref 7–18)
BUN/CREAT SERPL: 14.7 (ref 5.4–32)
CALCIUM SERPL-MCNC: 8.7 MG/DL (ref 8.5–10.1)
CHLORIDE SERPL-SCNC: 103 MMOL/L (ref 99–107)
CO2 SERPL-SCNC: 23.2 MMOL/L (ref 24–32)
CREAT SERPL-MCNC: 1.09 MG/DL (ref 0.6–1.1)
EOSINOPHIL # BLD AUTO: 0.5 X10'3 (ref 0–0.9)
EOSINOPHIL NFR BLD AUTO: 5 % (ref 0–6)
ERYTHROCYTE [DISTWIDTH] IN BLOOD BY AUTOMATED COUNT: 15.8 % (ref 11.5–14.5)
GFR SERPL CREATININE-BSD FRML MDRD: 69 ML/MIN
GLUCOSE SERPL-MCNC: 150 MG/DL (ref 70–104)
HCT VFR BLD AUTO: 39.7 % (ref 42–52)
HGB BLD-MCNC: 12.8 G/DL (ref 14–17.9)
LYMPHOCYTES # BLD AUTO: 1.2 X10'3 (ref 1.1–4.8)
LYMPHOCYTES NFR BLD AUTO: 11 % (ref 21–51)
MCH RBC QN AUTO: 28.1 PG (ref 27–31)
MCHC RBC AUTO-ENTMCNC: 32.4 G/DL (ref 33–36.5)
MCV RBC AUTO: 86.8 FL (ref 78–98)
MONOCYTES # BLD AUTO: 1.1 X10'3 (ref 0–0.9)
MONOCYTES NFR BLD AUTO: 10.3 % (ref 2–12)
NEUTROPHILS # BLD AUTO: 7.8 X10'3 (ref 1.8–7.7)
NEUTROPHILS NFR BLD AUTO: 73 % (ref 42–75)
PLATELET # BLD AUTO: 541 X10'3 (ref 140–440)
PMV BLD AUTO: 6.9 FL (ref 7.4–10.4)
POTASSIUM SERPL-SCNC: 4 MMOL/L (ref 3.5–5.1)
PROT SERPL-MCNC: 6.8 G/DL (ref 6.4–8.2)
RBC # BLD AUTO: 4.57 X10'6 (ref 4.7–6.1)
SODIUM SERPL-SCNC: 139 MMOL/L (ref 135–145)
WBC # BLD AUTO: 10.7 X10'3 (ref 4.5–11)

## 2021-10-02 PROCEDURE — 87081 CULTURE SCREEN ONLY: CPT

## 2021-10-02 PROCEDURE — 97116 GAIT TRAINING THERAPY: CPT

## 2021-10-02 PROCEDURE — 97161 PT EVAL LOW COMPLEX 20 MIN: CPT

## 2021-10-02 PROCEDURE — 99285 EMERGENCY DEPT VISIT HI MDM: CPT

## 2021-10-02 PROCEDURE — 83735 ASSAY OF MAGNESIUM: CPT

## 2021-10-02 PROCEDURE — 84145 PROCALCITONIN (PCT): CPT

## 2021-10-02 PROCEDURE — 80053 COMPREHEN METABOLIC PANEL: CPT

## 2021-10-02 PROCEDURE — 83605 ASSAY OF LACTIC ACID: CPT

## 2021-10-02 PROCEDURE — 36415 COLL VENOUS BLD VENIPUNCTURE: CPT

## 2021-10-02 PROCEDURE — 85025 COMPLETE CBC W/AUTO DIFF WBC: CPT

## 2021-10-02 PROCEDURE — 97530 THERAPEUTIC ACTIVITIES: CPT

## 2021-10-02 RX ADMIN — BUPROPION HYDROCHLORIDE SCH MG: 150 TABLET, FILM COATED, EXTENDED RELEASE ORAL at 19:40

## 2021-10-02 RX ADMIN — DOCUSATE SODIUM SCH MG: 100 CAPSULE, LIQUID FILLED ORAL at 19:38

## 2021-10-02 RX ADMIN — HYDROCODONE BITARTRATE AND ACETAMINOPHEN PRN TAB: 5; 325 TABLET ORAL at 19:39

## 2021-10-02 NOTE — NUR
Bleeding and reinforcement of dressing at shift change. Report given to Stew RUIZ. Bed change 
done, Stew will watch for bleeding. RN Charge Yesi also aware and monitoring. Pt stable. H 
&H stable on admit labs.

## 2021-10-02 NOTE — NUR
DR LYLES NOTIFIED: DRESSING TO LEFT LEG CONTINUES TO DRAIN LARGE AMOUNTS OF MARY RED 
BLOOD AFTER DRESSING WAS REINFORCED AT BEGINNING OF SHIFT. ORDERS OBTAINED TO CHECK CBC IN 
AM AND REINFORCE WITH AN ACE WRAP.

## 2021-10-02 NOTE — NUR
Per Dr Grewal, Dry Dressing with curlex for now since wound is bleeding, no need for wet 
to dry. Reinforce as needed. No need for dressing change orders until he unwraps it 
tomorrow.

## 2021-10-03 VITALS — SYSTOLIC BLOOD PRESSURE: 134 MMHG | DIASTOLIC BLOOD PRESSURE: 75 MMHG

## 2021-10-03 VITALS — SYSTOLIC BLOOD PRESSURE: 116 MMHG | DIASTOLIC BLOOD PRESSURE: 83 MMHG

## 2021-10-03 VITALS — SYSTOLIC BLOOD PRESSURE: 119 MMHG | DIASTOLIC BLOOD PRESSURE: 74 MMHG

## 2021-10-03 VITALS — SYSTOLIC BLOOD PRESSURE: 107 MMHG | DIASTOLIC BLOOD PRESSURE: 70 MMHG

## 2021-10-03 VITALS — DIASTOLIC BLOOD PRESSURE: 63 MMHG | SYSTOLIC BLOOD PRESSURE: 102 MMHG

## 2021-10-03 LAB
ALBUMIN SERPL BCP-MCNC: 2.4 G/DL (ref 3.4–5)
ALBUMIN/GLOB SERPL: 0.5 {RATIO} (ref 1.1–1.5)
ALP SERPL-CCNC: 132 IU/L (ref 46–116)
ALT SERPL W P-5'-P-CCNC: 33 U/L (ref 12–78)
ANION GAP SERPL CALCULATED.3IONS-SCNC: 11 MMOL/L (ref 8–16)
AST SERPL W P-5'-P-CCNC: 13 U/L (ref 10–37)
BASOPHILS # BLD AUTO: 0.1 X10'3 (ref 0–0.2)
BASOPHILS NFR BLD AUTO: 0.7 % (ref 0–1)
BILIRUB SERPL-MCNC: 0.4 MG/DL (ref 0.1–1)
BUN SERPL-MCNC: 18 MG/DL (ref 7–18)
BUN/CREAT SERPL: 16.8 (ref 5.4–32)
CALCIUM SERPL-MCNC: 8.6 MG/DL (ref 8.5–10.1)
CHLORIDE SERPL-SCNC: 99 MMOL/L (ref 99–107)
CO2 SERPL-SCNC: 25.4 MMOL/L (ref 24–32)
CREAT SERPL-MCNC: 1.07 MG/DL (ref 0.6–1.1)
EOSINOPHIL # BLD AUTO: 0.5 X10'3 (ref 0–0.9)
EOSINOPHIL NFR BLD AUTO: 4.3 % (ref 0–6)
ERYTHROCYTE [DISTWIDTH] IN BLOOD BY AUTOMATED COUNT: 15.7 % (ref 11.5–14.5)
GFR SERPL CREATININE-BSD FRML MDRD: 71 ML/MIN
GLUCOSE SERPL-MCNC: 104 MG/DL (ref 70–104)
HCT VFR BLD AUTO: 37.2 % (ref 42–52)
HGB BLD-MCNC: 12 G/DL (ref 14–17.9)
LYMPHOCYTES # BLD AUTO: 1.4 X10'3 (ref 1.1–4.8)
LYMPHOCYTES NFR BLD AUTO: 11.1 % (ref 21–51)
MAGNESIUM SERPL-MCNC: 2.1 MG/DL (ref 1.5–2.4)
MCH RBC QN AUTO: 28 PG (ref 27–31)
MCHC RBC AUTO-ENTMCNC: 32.4 G/DL (ref 33–36.5)
MCV RBC AUTO: 86.4 FL (ref 78–98)
MONOCYTES # BLD AUTO: 1.5 X10'3 (ref 0–0.9)
MONOCYTES NFR BLD AUTO: 12.2 % (ref 2–12)
NEUTROPHILS # BLD AUTO: 8.9 X10'3 (ref 1.8–7.7)
NEUTROPHILS NFR BLD AUTO: 71.7 % (ref 42–75)
PLATELET # BLD AUTO: 577 X10'3 (ref 140–440)
PMV BLD AUTO: 6.9 FL (ref 7.4–10.4)
POTASSIUM SERPL-SCNC: 3.8 MMOL/L (ref 3.5–5.1)
PROT SERPL-MCNC: 6.8 G/DL (ref 6.4–8.2)
RBC # BLD AUTO: 4.31 X10'6 (ref 4.7–6.1)
SODIUM SERPL-SCNC: 135 MMOL/L (ref 135–145)
WBC # BLD AUTO: 12.4 X10'3 (ref 4.5–11)

## 2021-10-03 RX ADMIN — HYDROCODONE BITARTRATE AND ACETAMINOPHEN PRN TAB: 5; 325 TABLET ORAL at 09:36

## 2021-10-03 RX ADMIN — DOCUSATE SODIUM SCH MG: 100 CAPSULE, LIQUID FILLED ORAL at 09:35

## 2021-10-03 RX ADMIN — BUPROPION HYDROCHLORIDE SCH MG: 150 TABLET, FILM COATED, EXTENDED RELEASE ORAL at 09:36

## 2021-10-03 RX ADMIN — DOCUSATE SODIUM SCH MG: 100 CAPSULE, LIQUID FILLED ORAL at 20:37

## 2021-10-03 RX ADMIN — HYDROCODONE BITARTRATE AND ACETAMINOPHEN PRN TAB: 5; 325 TABLET ORAL at 17:36

## 2021-10-03 RX ADMIN — OXYCODONE HYDROCHLORIDE AND ACETAMINOPHEN PRN TAB: 5; 325 TABLET ORAL at 23:30

## 2021-10-03 RX ADMIN — BUPROPION HYDROCHLORIDE SCH MG: 150 TABLET, FILM COATED, EXTENDED RELEASE ORAL at 20:37

## 2021-10-03 RX ADMIN — HYDROCODONE BITARTRATE AND ACETAMINOPHEN PRN TAB: 5; 325 TABLET ORAL at 02:56

## 2021-10-03 NOTE — NUR
Patient in room [facility]. I have received report from JOSEPH Clement   and had the opportunity to 
ask questions and assume patient care.  Dressing to left leg with sm amt serousang drng 
outlined. pulses palpable

-------------------------------------------------------------------------------

Addendum: 10/03/21 at 1839 by Irving Soria RN

-------------------------------------------------------------------------------

Amended: Links added.

## 2021-10-03 NOTE — NUR
hs snack given.

-------------------------------------------------------------------------------

Addendum: 10/03/21 at 2336 by Irving Soria RN

-------------------------------------------------------------------------------

Amended: Links added.

## 2021-10-03 NOTE — NUR
Report given to Cynthia RUIZ, pt is doing well in bed. No excessive drainage from bandage at 
this time, no signs of active bleeding.

## 2021-10-04 VITALS — DIASTOLIC BLOOD PRESSURE: 84 MMHG | SYSTOLIC BLOOD PRESSURE: 116 MMHG

## 2021-10-04 VITALS — DIASTOLIC BLOOD PRESSURE: 79 MMHG | SYSTOLIC BLOOD PRESSURE: 116 MMHG

## 2021-10-04 VITALS — DIASTOLIC BLOOD PRESSURE: 69 MMHG | SYSTOLIC BLOOD PRESSURE: 113 MMHG

## 2021-10-04 LAB
ALBUMIN SERPL BCP-MCNC: 2.3 G/DL (ref 3.4–5)
ALBUMIN/GLOB SERPL: 0.5 {RATIO} (ref 1.1–1.5)
ALP SERPL-CCNC: 124 IU/L (ref 46–116)
ALT SERPL W P-5'-P-CCNC: 33 U/L (ref 12–78)
ANION GAP SERPL CALCULATED.3IONS-SCNC: 11 MMOL/L (ref 8–16)
AST SERPL W P-5'-P-CCNC: 17 U/L (ref 10–37)
BASOPHILS # BLD AUTO: 0.1 X10'3 (ref 0–0.2)
BASOPHILS NFR BLD AUTO: 0.8 % (ref 0–1)
BILIRUB SERPL-MCNC: 0.2 MG/DL (ref 0.1–1)
BUN SERPL-MCNC: 21 MG/DL (ref 7–18)
BUN/CREAT SERPL: 17.8 (ref 5.4–32)
CALCIUM SERPL-MCNC: 8.3 MG/DL (ref 8.5–10.1)
CHLORIDE SERPL-SCNC: 102 MMOL/L (ref 99–107)
CO2 SERPL-SCNC: 24.8 MMOL/L (ref 24–32)
CREAT SERPL-MCNC: 1.18 MG/DL (ref 0.6–1.1)
EOSINOPHIL # BLD AUTO: 0.6 X10'3 (ref 0–0.9)
EOSINOPHIL NFR BLD AUTO: 4.7 % (ref 0–6)
ERYTHROCYTE [DISTWIDTH] IN BLOOD BY AUTOMATED COUNT: 15.2 % (ref 11.5–14.5)
GFR SERPL CREATININE-BSD FRML MDRD: 63 ML/MIN
GLUCOSE SERPL-MCNC: 99 MG/DL (ref 70–104)
HCT VFR BLD AUTO: 34.3 % (ref 42–52)
HGB BLD-MCNC: 11.2 G/DL (ref 14–17.9)
LYMPHOCYTES # BLD AUTO: 1.8 X10'3 (ref 1.1–4.8)
LYMPHOCYTES NFR BLD AUTO: 14.3 % (ref 21–51)
MAGNESIUM SERPL-MCNC: 2 MG/DL (ref 1.5–2.4)
MCH RBC QN AUTO: 28 PG (ref 27–31)
MCHC RBC AUTO-ENTMCNC: 32.5 G/DL (ref 33–36.5)
MCV RBC AUTO: 86 FL (ref 78–98)
MONOCYTES # BLD AUTO: 1.5 X10'3 (ref 0–0.9)
MONOCYTES NFR BLD AUTO: 11.7 % (ref 2–12)
NEUTROPHILS # BLD AUTO: 8.6 X10'3 (ref 1.8–7.7)
NEUTROPHILS NFR BLD AUTO: 68.5 % (ref 42–75)
PLATELET # BLD AUTO: 591 X10'3 (ref 140–440)
PMV BLD AUTO: 6.7 FL (ref 7.4–10.4)
POTASSIUM SERPL-SCNC: 4.2 MMOL/L (ref 3.5–5.1)
PROT SERPL-MCNC: 6.6 G/DL (ref 6.4–8.2)
RBC # BLD AUTO: 3.99 X10'6 (ref 4.7–6.1)
SODIUM SERPL-SCNC: 138 MMOL/L (ref 135–145)
WBC # BLD AUTO: 12.5 X10'3 (ref 4.5–11)

## 2021-10-04 RX ADMIN — OXYCODONE HYDROCHLORIDE AND ACETAMINOPHEN PRN TAB: 5; 325 TABLET ORAL at 03:19

## 2021-10-04 RX ADMIN — BUPROPION HYDROCHLORIDE SCH MG: 150 TABLET, FILM COATED, EXTENDED RELEASE ORAL at 20:14

## 2021-10-04 RX ADMIN — BUPROPION HYDROCHLORIDE SCH MG: 150 TABLET, FILM COATED, EXTENDED RELEASE ORAL at 07:36

## 2021-10-04 RX ADMIN — DOCUSATE SODIUM SCH MG: 100 CAPSULE, LIQUID FILLED ORAL at 20:14

## 2021-10-04 RX ADMIN — DOCUSATE SODIUM SCH MG: 100 CAPSULE, LIQUID FILLED ORAL at 07:36

## 2021-10-04 NOTE — NUR
Patient in room [facility] 350. I have received report from  lisa RUIZ  and had the opportunity 
to ask questions and assume patient care.

## 2021-10-04 NOTE — NUR
Patient in room [facility] 350B. I have received report from  JOSEPH Camilo  and had the 
opportunity to ask questions and assume patient care.

## 2021-10-04 NOTE — NUR
Problems reprioritized. Patient report given, questions answered & plan of care reviewed 
with JOSEPH Lozoya. 

-------------------------------------------------------------------------------

Addendum: 10/04/21 at 0621 by Irving Soria RN

-------------------------------------------------------------------------------

Amended: Links added.

## 2021-10-04 NOTE — NUR
PAGER ID:  [phone] 

MESSAGE:  350b Danny, would you like the pts left leg dressing to be changed before 
discharge home today? or leave in place due to it bleeding before? iris [phone]

## 2021-10-04 NOTE — NUR
Problems reprioritized. Patient report given, questions answered & plan of care reviewed 
with Cielo RUIZ.

## 2021-10-05 VITALS — SYSTOLIC BLOOD PRESSURE: 117 MMHG | DIASTOLIC BLOOD PRESSURE: 80 MMHG

## 2021-10-05 VITALS — SYSTOLIC BLOOD PRESSURE: 105 MMHG | DIASTOLIC BLOOD PRESSURE: 60 MMHG

## 2021-10-05 LAB
ALBUMIN SERPL BCP-MCNC: 2.5 G/DL (ref 3.4–5)
ALBUMIN/GLOB SERPL: 0.6 {RATIO} (ref 1.1–1.5)
ALP SERPL-CCNC: 126 IU/L (ref 46–116)
ALT SERPL W P-5'-P-CCNC: 34 U/L (ref 12–78)
ANION GAP SERPL CALCULATED.3IONS-SCNC: 13 MMOL/L (ref 8–16)
AST SERPL W P-5'-P-CCNC: 17 U/L (ref 10–37)
BASOPHILS # BLD AUTO: 0.1 X10'3 (ref 0–0.2)
BASOPHILS NFR BLD AUTO: 0.8 % (ref 0–1)
BILIRUB SERPL-MCNC: 0.2 MG/DL (ref 0.1–1)
BUN SERPL-MCNC: 19 MG/DL (ref 7–18)
BUN/CREAT SERPL: 16.1 (ref 5.4–32)
CALCIUM SERPL-MCNC: 8.3 MG/DL (ref 8.5–10.1)
CHLORIDE SERPL-SCNC: 103 MMOL/L (ref 99–107)
CO2 SERPL-SCNC: 22.9 MMOL/L (ref 24–32)
CREAT SERPL-MCNC: 1.18 MG/DL (ref 0.6–1.1)
EOSINOPHIL # BLD AUTO: 0.5 X10'3 (ref 0–0.9)
EOSINOPHIL NFR BLD AUTO: 4.2 % (ref 0–6)
ERYTHROCYTE [DISTWIDTH] IN BLOOD BY AUTOMATED COUNT: 15.1 % (ref 11.5–14.5)
GFR SERPL CREATININE-BSD FRML MDRD: 63 ML/MIN
GLUCOSE SERPL-MCNC: 90 MG/DL (ref 70–104)
HCT VFR BLD AUTO: 31.9 % (ref 42–52)
HGB BLD-MCNC: 10.7 G/DL (ref 14–17.9)
LYMPHOCYTES # BLD AUTO: 2 X10'3 (ref 1.1–4.8)
LYMPHOCYTES NFR BLD AUTO: 16.2 % (ref 21–51)
MAGNESIUM SERPL-MCNC: 1.9 MG/DL (ref 1.5–2.4)
MCH RBC QN AUTO: 28.2 PG (ref 27–31)
MCHC RBC AUTO-ENTMCNC: 33.4 G/DL (ref 33–36.5)
MCV RBC AUTO: 84.5 FL (ref 78–98)
MONOCYTES # BLD AUTO: 1.4 X10'3 (ref 0–0.9)
MONOCYTES NFR BLD AUTO: 11.4 % (ref 2–12)
NEUTROPHILS # BLD AUTO: 8.2 X10'3 (ref 1.8–7.7)
NEUTROPHILS NFR BLD AUTO: 67.4 % (ref 42–75)
PLATELET # BLD AUTO: 598 X10'3 (ref 140–440)
PMV BLD AUTO: 6.8 FL (ref 7.4–10.4)
POTASSIUM SERPL-SCNC: 3.9 MMOL/L (ref 3.5–5.1)
PROT SERPL-MCNC: 6.6 G/DL (ref 6.4–8.2)
RBC # BLD AUTO: 3.77 X10'6 (ref 4.7–6.1)
SODIUM SERPL-SCNC: 139 MMOL/L (ref 135–145)
WBC # BLD AUTO: 12.1 X10'3 (ref 4.5–11)

## 2021-10-05 RX ADMIN — OXYCODONE HYDROCHLORIDE AND ACETAMINOPHEN PRN TAB: 5; 325 TABLET ORAL at 03:18

## 2021-10-05 RX ADMIN — OXYCODONE HYDROCHLORIDE AND ACETAMINOPHEN PRN TAB: 5; 325 TABLET ORAL at 07:21

## 2021-10-05 RX ADMIN — DOCUSATE SODIUM SCH MG: 100 CAPSULE, LIQUID FILLED ORAL at 07:21

## 2021-10-05 RX ADMIN — BUPROPION HYDROCHLORIDE SCH MG: 150 TABLET, FILM COATED, EXTENDED RELEASE ORAL at 07:21

## 2021-10-05 NOTE — NUR
Patient discharge instructions reviewed with patient and patient verbalized understanding. 
Patients IV dc'd cannula intact. Called patients Pharmacy and they state patients 
medications are ready and covered by insurance. Patient was given 1 week worth of dressing 
supplies and he did a return demonstration on dressing changes. Patient has home health 
through [facility] that will be coming to see him Edie mahajan is setting up. 
Patient taken to friends vehicle via wheelchair and has all his belongings

## 2021-10-05 NOTE — NUR
Spoke to Lorraine out patient wound clinic and she just needs a progress note/ discharge plan 
note for would clinic to see patient on Monday in wound clinic from Dr Keita. Lorraine will 
look for the note tomorrow and contact patient to make appointment.

## 2021-10-05 NOTE — NUR
Problems reprioritized. Patient report given, questions answered & plan of care reviewed 
with JOSEPH Camilo.

## 2021-10-05 NOTE — NUR
Page Sent

 

 



PAGER ID:  [phone] 

MESSAGE:  350 b Dr Neela Huynh cleared pt for discharged today. Pt needs to have Plavix 
and Eliquis on discharge meds. thanks iris

## 2021-10-05 NOTE — NUR
contacted Neela about pt wanting to leave possible AMA. h/h was stable, pt had no bleeding 
at surgical site and is on plavix and eliquis since yesterday 10/4/21.  Pt will go home on 
both and Neela cleared pt for discharge. will contact Jessica about discharge. Took 
pictures they will be in chart and pt dressing was changed 10/5/21. he was able to assist in 
the changing.

## 2021-10-05 NOTE — NUR
Patient in room [facility] 350. I have received report from Cielo RUIZ   and had the opportunity to 
ask questions and assume patient care.

## 2021-11-17 ENCOUNTER — HOSPITAL ENCOUNTER (OUTPATIENT)
Dept: HOSPITAL 94 - PAS | Age: 56
Discharge: HOME | End: 2021-11-17
Attending: SURGERY
Payer: MEDICARE

## 2021-11-17 VITALS — DIASTOLIC BLOOD PRESSURE: 77 MMHG | SYSTOLIC BLOOD PRESSURE: 112 MMHG

## 2021-11-17 VITALS — BODY MASS INDEX: 32.67 KG/M2 | HEIGHT: 73 IN | WEIGHT: 246.48 LBS

## 2021-11-17 VITALS — DIASTOLIC BLOOD PRESSURE: 55 MMHG | SYSTOLIC BLOOD PRESSURE: 95 MMHG

## 2021-11-17 VITALS — SYSTOLIC BLOOD PRESSURE: 117 MMHG | DIASTOLIC BLOOD PRESSURE: 91 MMHG

## 2021-11-17 VITALS — SYSTOLIC BLOOD PRESSURE: 115 MMHG | DIASTOLIC BLOOD PRESSURE: 81 MMHG

## 2021-11-17 VITALS — DIASTOLIC BLOOD PRESSURE: 58 MMHG | SYSTOLIC BLOOD PRESSURE: 102 MMHG

## 2021-11-17 VITALS — SYSTOLIC BLOOD PRESSURE: 154 MMHG | DIASTOLIC BLOOD PRESSURE: 93 MMHG

## 2021-11-17 VITALS — DIASTOLIC BLOOD PRESSURE: 58 MMHG | SYSTOLIC BLOOD PRESSURE: 103 MMHG

## 2021-11-17 VITALS — SYSTOLIC BLOOD PRESSURE: 102 MMHG | DIASTOLIC BLOOD PRESSURE: 58 MMHG

## 2021-11-17 VITALS — DIASTOLIC BLOOD PRESSURE: 76 MMHG | SYSTOLIC BLOOD PRESSURE: 109 MMHG

## 2021-11-17 VITALS — DIASTOLIC BLOOD PRESSURE: 91 MMHG | SYSTOLIC BLOOD PRESSURE: 126 MMHG

## 2021-11-17 DIAGNOSIS — Z87.891: ICD-10-CM

## 2021-11-17 DIAGNOSIS — F32.9: ICD-10-CM

## 2021-11-17 DIAGNOSIS — Z48.1: Primary | ICD-10-CM

## 2021-11-17 DIAGNOSIS — M10.9: ICD-10-CM

## 2021-11-17 DIAGNOSIS — Z79.899: ICD-10-CM

## 2021-11-17 DIAGNOSIS — Z86.718: ICD-10-CM

## 2021-11-17 DIAGNOSIS — Z93.2: ICD-10-CM

## 2021-11-17 DIAGNOSIS — Z98.890: ICD-10-CM

## 2021-11-17 DIAGNOSIS — E66.9: ICD-10-CM

## 2021-11-17 DIAGNOSIS — G47.30: ICD-10-CM

## 2021-11-17 DIAGNOSIS — Z72.89: ICD-10-CM

## 2021-11-17 DIAGNOSIS — Z90.49: ICD-10-CM

## 2021-11-17 DIAGNOSIS — Z85.47: ICD-10-CM

## 2021-11-17 DIAGNOSIS — Z86.14: ICD-10-CM

## 2021-11-17 DIAGNOSIS — Z93.3: ICD-10-CM

## 2021-11-17 DIAGNOSIS — Z85.038: ICD-10-CM

## 2021-11-17 PROCEDURE — 82948 REAGENT STRIP/BLOOD GLUCOSE: CPT

## 2021-11-17 PROCEDURE — 15100 SPLT AGRFT T/A/L 1ST 100SQCM: CPT

## 2021-11-17 NOTE — NUR
Received from OR via , accompanied by Anesthesiologist DR SCHREIBER and report given by 
Anesthesiolgist. PT PRESENTTS WITH 20G LEFT FOREARM, WOUND VAC ON LEFT LOWER LEG, DRESSING 
ON LOWER LEFT LEG DRY AND INTACT. DRESSING ON RIGHT THIGH DRY AND INTACT. VSS.

-------------------------------------------------------------------------------

Addendum: 11/17/21 at 1753 by Noemi Main RN, RN

-------------------------------------------------------------------------------

Amended: Links added.

## 2021-11-17 NOTE — NUR
DC INSTRUCTIONS REVIEWED WITH PT. PT MELITA WOUND VAC AND EXTRA COLLECTIONS DEVICE FOR WOUND 
VAC AND WAS INSTRUCTED TO TAKE ALL MATERIALS TO WOUND VAC APPOINTMENT. PT VSS, PT 
TRANSPORTED BY WHEEL CHAIR TO PARKING LOT TO GO HOME WITH CONSUELO HONG.  1 PT BAG GIVNE TO 
PT. 

-------------------------------------------------------------------------------

Addendum: 11/17/21 at 1618 by Noemi Main RN, RN

-------------------------------------------------------------------------------

Amended: Links added.

## 2023-02-14 ENCOUNTER — HOSPITAL ENCOUNTER (OUTPATIENT)
Dept: HOSPITAL 94 - SSTAY O | Age: 58
Discharge: HOME | End: 2023-02-14
Attending: RADIOLOGY
Payer: MEDICARE

## 2023-02-14 VITALS — SYSTOLIC BLOOD PRESSURE: 124 MMHG | DIASTOLIC BLOOD PRESSURE: 75 MMHG

## 2023-02-14 VITALS — DIASTOLIC BLOOD PRESSURE: 84 MMHG | SYSTOLIC BLOOD PRESSURE: 121 MMHG

## 2023-02-14 VITALS — SYSTOLIC BLOOD PRESSURE: 128 MMHG | DIASTOLIC BLOOD PRESSURE: 76 MMHG

## 2023-02-14 VITALS — SYSTOLIC BLOOD PRESSURE: 124 MMHG | DIASTOLIC BLOOD PRESSURE: 82 MMHG

## 2023-02-14 VITALS — HEIGHT: 72 IN | WEIGHT: 249.56 LBS | BODY MASS INDEX: 33.8 KG/M2

## 2023-02-14 VITALS — DIASTOLIC BLOOD PRESSURE: 73 MMHG | SYSTOLIC BLOOD PRESSURE: 119 MMHG

## 2023-02-14 VITALS — SYSTOLIC BLOOD PRESSURE: 116 MMHG | DIASTOLIC BLOOD PRESSURE: 90 MMHG

## 2023-02-14 VITALS — SYSTOLIC BLOOD PRESSURE: 121 MMHG | DIASTOLIC BLOOD PRESSURE: 81 MMHG

## 2023-02-14 VITALS — DIASTOLIC BLOOD PRESSURE: 89 MMHG | SYSTOLIC BLOOD PRESSURE: 122 MMHG

## 2023-02-14 DIAGNOSIS — Z85.038: ICD-10-CM

## 2023-02-14 DIAGNOSIS — Z72.89: ICD-10-CM

## 2023-02-14 DIAGNOSIS — Z88.8: ICD-10-CM

## 2023-02-14 DIAGNOSIS — Z79.899: ICD-10-CM

## 2023-02-14 DIAGNOSIS — Z87.891: ICD-10-CM

## 2023-02-14 DIAGNOSIS — I74.3: Primary | ICD-10-CM

## 2023-02-14 DIAGNOSIS — Z85.47: ICD-10-CM

## 2023-02-14 DIAGNOSIS — Z88.2: ICD-10-CM

## 2023-02-14 DIAGNOSIS — Z98.890: ICD-10-CM

## 2023-02-14 DIAGNOSIS — U09.9: ICD-10-CM

## 2023-02-14 LAB
ALBUMIN SERPL BCP-MCNC: 3.5 G/DL (ref 3.4–5)
ANION GAP SERPL CALCULATED.3IONS-SCNC: 11 MMOL/L (ref 8–16)
BASOPHILS # BLD AUTO: 0.1 X10'3 (ref 0–0.2)
BASOPHILS NFR BLD AUTO: 0.7 % (ref 0–1)
BUN SERPL-MCNC: 19 MG/DL (ref 7–18)
BUN/CREAT SERPL: 17 (ref 5.4–32)
CALCIUM SERPL-MCNC: 9 MG/DL (ref 8.5–10.1)
CHLORIDE SERPL-SCNC: 101 MMOL/L (ref 99–107)
CO2 SERPL-SCNC: 25.5 MMOL/L (ref 24–32)
CREAT SERPL-MCNC: 1.12 MG/DL (ref 0.6–1.1)
EOSINOPHIL # BLD AUTO: 0.4 X10'3 (ref 0–0.9)
EOSINOPHIL NFR BLD AUTO: 3.6 % (ref 0–6)
ERYTHROCYTE [DISTWIDTH] IN BLOOD BY AUTOMATED COUNT: 16.8 % (ref 11.5–14.5)
GFR SERPL CREATININE-BSD FRML MDRD: 67 ML/MIN
GLUCOSE SERPL-MCNC: 107 MG/DL (ref 70–104)
HCT VFR BLD AUTO: 50.5 % (ref 42–52)
HGB BLD-MCNC: 16.8 G/DL (ref 14–17.9)
LYMPHOCYTES # BLD AUTO: 0.9 X10'3 (ref 1.1–4.8)
LYMPHOCYTES NFR BLD AUTO: 8.5 % (ref 21–51)
MCH RBC QN AUTO: 31.8 PG (ref 27–31)
MCHC RBC AUTO-ENTMCNC: 33.3 G/DL (ref 33–36.5)
MCV RBC AUTO: 95.5 FL (ref 78–98)
MONOCYTES # BLD AUTO: 1.2 X10'3 (ref 0–0.9)
MONOCYTES NFR BLD AUTO: 11.5 % (ref 2–12)
NEUTROPHILS # BLD AUTO: 7.7 X10'3 (ref 1.8–7.7)
NEUTROPHILS NFR BLD AUTO: 75.7 % (ref 42–75)
PLATELET # BLD AUTO: 291 X10'3 (ref 140–440)
PMV BLD AUTO: 7.1 FL (ref 7.4–10.4)
POTASSIUM SERPL-SCNC: 4.2 MMOL/L (ref 3.5–5.1)
RBC # BLD AUTO: 5.29 X10'6 (ref 4.7–6.1)
SODIUM SERPL-SCNC: 137 MMOL/L (ref 135–145)
WBC # BLD AUTO: 10.2 X10'3 (ref 4.5–11)

## 2023-02-14 PROCEDURE — 36415 COLL VENOUS BLD VENIPUNCTURE: CPT

## 2023-02-14 PROCEDURE — 85610 PROTHROMBIN TIME: CPT

## 2023-02-14 PROCEDURE — 99152 MOD SED SAME PHYS/QHP 5/>YRS: CPT

## 2023-02-14 PROCEDURE — 99153 MOD SED SAME PHYS/QHP EA: CPT

## 2023-02-14 PROCEDURE — 37228: CPT

## 2023-02-14 PROCEDURE — 75710 ARTERY X-RAYS ARM/LEG: CPT

## 2023-02-14 PROCEDURE — 80048 BASIC METABOLIC PNL TOTAL CA: CPT

## 2023-02-14 PROCEDURE — 85025 COMPLETE CBC W/AUTO DIFF WBC: CPT

## 2023-02-14 PROCEDURE — 37232: CPT
